# Patient Record
Sex: MALE | Race: WHITE
[De-identification: names, ages, dates, MRNs, and addresses within clinical notes are randomized per-mention and may not be internally consistent; named-entity substitution may affect disease eponyms.]

---

## 2018-07-03 ENCOUNTER — HOSPITAL ENCOUNTER (INPATIENT)
Dept: HOSPITAL 31 - C.ER | Age: 78
LOS: 4 days | Discharge: HOME | DRG: 884 | End: 2018-07-07
Attending: INTERNAL MEDICINE | Admitting: INTERNAL MEDICINE
Payer: MEDICARE

## 2018-07-03 VITALS — BODY MASS INDEX: 33.4 KG/M2

## 2018-07-03 DIAGNOSIS — R40.4: Primary | ICD-10-CM

## 2018-07-03 DIAGNOSIS — R79.89: ICD-10-CM

## 2018-07-03 DIAGNOSIS — J43.9: ICD-10-CM

## 2018-07-03 DIAGNOSIS — I10: ICD-10-CM

## 2018-07-03 DIAGNOSIS — I35.0: ICD-10-CM

## 2018-07-03 DIAGNOSIS — E78.00: ICD-10-CM

## 2018-07-03 DIAGNOSIS — Z87.891: ICD-10-CM

## 2018-07-03 DIAGNOSIS — E78.5: ICD-10-CM

## 2018-07-03 DIAGNOSIS — E86.0: ICD-10-CM

## 2018-07-03 DIAGNOSIS — G47.30: ICD-10-CM

## 2018-07-03 DIAGNOSIS — Z95.5: ICD-10-CM

## 2018-07-03 LAB
ALBUMIN SERPL-MCNC: 4.7 [, G/DL] (ref 3.5–5)
ALBUMIN/GLOB SERPL: 1.3 [,] (ref 1–2.1)
ALT SERPL-CCNC: 12 [, U/L] (ref 21–72)
AST SERPL-CCNC: 40 [, U/L] (ref 17–59)
BACTERIA #/AREA URNS HPF: (no result) [,]
BASOPHILS # BLD AUTO: 0.1 [, K/UL] (ref 0–0.2)
BASOPHILS NFR BLD: 1 [, %] (ref 0–2)
BILIRUB UR-MCNC: NEGATIVE [,]
BNP SERPL-MCNC: 220 [, PG/ML] (ref 0–900)
BUN SERPL-MCNC: 30 [, MG/DL] (ref 9–20)
CALCIUM SERPL-MCNC: 9.4 [, MG/DL] (ref 8.6–10.4)
EOSINOPHIL # BLD AUTO: 0 [, K/UL] (ref 0–0.7)
EOSINOPHIL NFR BLD: 0.6 [, %] (ref 0–4)
ERYTHROCYTE [DISTWIDTH] IN BLOOD BY AUTOMATED COUNT: 13.8 [, %] (ref 11.5–14.5)
GFR NON-AFRICAN AMERICAN: 20 [,]
GLUCOSE UR STRIP-MCNC: NORMAL [, MG/DL]
HGB BLD-MCNC: 16.9 [, G/DL] (ref 12–18)
HYALINE CASTS #/AREA URNS LPF: >20 [, /LPF] (ref 0–2)
LEUKOCYTE ESTERASE UR-ACNC: (no result) [, LEU/UL]
LYMPHOCYTES # BLD AUTO: 1.6 [, K/UL] (ref 1–4.3)
LYMPHOCYTES NFR BLD AUTO: 19.7 [, %] (ref 20–40)
MCH RBC QN AUTO: 33.6 [, PG] (ref 27–31)
MCHC RBC AUTO-ENTMCNC: 33.5 [, G/DL] (ref 33–37)
MCV RBC AUTO: 100.4 [, FL] (ref 80–94)
MONOCYTES # BLD: 0.8 [, K/UL] (ref 0–0.8)
MONOCYTES NFR BLD: 9.6 [, %] (ref 0–10)
NEUTROPHILS # BLD: 5.5 [, K/UL] (ref 1.8–7)
NEUTROPHILS NFR BLD AUTO: 69.1 [, %] (ref 50–75)
NRBC BLD AUTO-RTO: 0.1 [, %] (ref 0–2)
PH UR STRIP: 5 [,] (ref 5–8)
PLATELET # BLD: 290 [, K/UL] (ref 130–400)
PMV BLD AUTO: 7.7 [, FL] (ref 7.2–11.7)
PROT UR STRIP-MCNC: (no result) [, MG/DL]
RBC # BLD AUTO: 5.02 [, MIL/UL] (ref 4.4–5.9)
RBC # UR STRIP: (no result) [,]
SP GR UR STRIP: 1.02 [,] (ref 1–1.03)
SQUAMOUS EPITHIAL: 3 [, /HPF] (ref 0–5)
TROPONIN I SERPL-MCNC: 0.01 [, NG/ML] (ref 0–0.12)
UROBILINOGEN UR-MCNC: 2 [, MG/DL] (ref 0.2–1)
WBC # BLD AUTO: 8 [, K/UL] (ref 4.8–10.8)

## 2018-07-03 NOTE — RAD
PROCEDURE:  CHEST RADIOGRAPH, 1 VIEW



HISTORY:

SOB



COMPARISON:

Chest radiograph dated 06/12/2013.



FINDINGS:



LUNGS:

Bibasilar atelectasis.



PLEURA:

No pneumothorax or pleural fluid seen.



CARDIOVASCULAR:

Atherosclerotic aortic calcifications.  Cardiomediastinal silhouette 

the upper limits of normal in sizeNormal.



OSSEOUS STRUCTURES:

Degenerative changes.



VISUALIZED UPPER ABDOMEN:

Normal.



OTHER FINDINGS:

Small hiatal hernia. 



IMPRESSION:

No active disease.

## 2018-07-03 NOTE — C.PDOC
History Of Present Illness


78 year old male presents to the ER with family after he was found trying to 

enter [place] [employer] he believed was his home. As per family, patient has been 

becoming more confused as of late and is nothing new. Family states patient 

refuses to see his doctor. Denies trauma, injuries, or complaints.


Time Seen by Provider: 18 16:01


Chief Complaint (Nursing): Medical Clearance


History Per: Patient


History/Exam Limitations: no limitations


Onset/Duration Of Symptoms: Days


Current Symptoms Are (Timing): Still Present


Recent travel outside of the [place]: No





Past Medical History


Reviewed: Historical Data, Nursing Documentation, Vital Signs


Vital Signs: 


 Last Vital Signs











Temp  97.8 F   18 19:40


 


Pulse  74   18 19:40


 


Resp  16   18 19:40


 


BP  119/67   18 19:40


 


Pulse Ox  96   18 19:40














- Medical History


PMH: Cardia Arrhythmia, COPD, Emphysema, HTN, Hypercholesterolemia, Sleep Apnea 

(HAS C-PAP DOESN'T USE)


Surgical History: Coronary Stent (X2)





- [facility] Procedures








CORONARY ARTERIOGRAM NEC (08/26/15)


LEFT HEART CARDIAC CATH (08/26/15)








Family History: States: Unknown Family Hx





- Social History


Hx Tobacco Use: No


Hx Alcohol Use: Yes


Hx Substance Use: No





- Immunization History


Hx Tetanus Toxoid Vaccination: No


Hx Influenza Vaccination: No


Hx Pneumococcal Vaccination: No





Review Of Systems


Except As Marked, All Systems Reviewed And Found Negative.


Neurological: Positive for: Confusion





Physical Exam





- Physical Exam


Appears: Non-toxic


Skin: Normal Color, Warm, Dry


Head: Atraumatic, Normacephalic


Eye(s): bilateral: Normal Inspection


Oral Mucosa: Moist


Neck: Normal, Supple


Chest: Symmetrical, No Tenderness


Cardiovascular: Rhythm Regular


Respiratory: Normal Breath Sounds, No Rales, No Rhonchi, No Wheezing


Gastrointestinal/Abdominal: Soft, No Tenderness


Back: No CVA Tenderness


Extremity: Normal ROM (x4)


Neurological/Psych: Other (Awake, alert, oriented to time and place)





ED Course And Treatment





- Laboratory Results


Result Diagrams: 


 18 17:16





 18 17:16


ECG: Interpreted By Me, Viewed By Me


ECG Rhythm: Sinus Rhythm


ECG Interpretation: Normal


Interpretation Of ECG: Atrial contractions, normal intervals and axis, no ST/T 

wave abnormalities.


Rate From EC


O2 Sat by Pulse Oximetry: 99 (Room air)


Pulse Ox Interpretation: Normal





Medical Decision Making


Medical Decision Making: 





Plan:


* CT Head


* EKG


* Blood work


* CXR


* Urinalysis





Assessment: Dementia


acute kidney injury





case discussed with Dr. Ervin and will admit to medical surgical floor. 





Disposition


Discussed With .: Jose Ervin


Doctor Will See Patient In The: Hospital





- Disposition


Disposition: HOSPITALIZED


Disposition Time: 20:30


Condition: FAIR


Forms:  [facility] (English)





- Clinical Impression


Clinical Impression: 


 Confusion, Acute kidney injury








- Scribe Statement


The provider has reviewed the documentation as recorded by the Scribe





Steve Leung





All medical record entries made by the Scribe were at my direction and 

personally dictated by me. I have reviewed the chart and agree that the record 

accurately reflects my personal performance of the history, physical exam, 

medical decision making, and the department course for this patient. I have 

also personally directed, reviewed, and agree with the discharge instructions 

and disposition.

## 2018-07-04 VITALS — RESPIRATION RATE: 20 BRPM

## 2018-07-04 LAB
ALBUMIN SERPL-MCNC: 3.6 [, G/DL] (ref 3.5–5)
ALBUMIN/GLOB SERPL: 1.3 [,] (ref 1–2.1)
ALT SERPL-CCNC: 25 [, U/L] (ref 21–72)
AST SERPL-CCNC: 31 [, U/L] (ref 17–59)
BASOPHILS # BLD AUTO: 0 [, K/UL] (ref 0–0.2)
BASOPHILS NFR BLD: 0.6 [, %] (ref 0–2)
BUN SERPL-MCNC: 27 [, MG/DL] (ref 9–20)
CALCIUM SERPL-MCNC: 8.2 [, MG/DL] (ref 8.6–10.4)
CK MB SERPL-MCNC: 0.88 [, NG/ML] (ref 0–3.38)
CK MB SERPL-MCNC: 0.9 [, NG/ML] (ref 0–3.38)
EOSINOPHIL # BLD AUTO: 0.1 [, K/UL] (ref 0–0.7)
EOSINOPHIL NFR BLD: 1.7 [, %] (ref 0–4)
ERYTHROCYTE [DISTWIDTH] IN BLOOD BY AUTOMATED COUNT: 13.6 [, %] (ref 11.5–14.5)
GFR NON-AFRICAN AMERICAN: 59 [,]
HGB BLD-MCNC: 15 [, G/DL] (ref 12–18)
LYMPHOCYTES # BLD AUTO: 1.4 [, K/UL] (ref 1–4.3)
LYMPHOCYTES NFR BLD AUTO: 20.6 [, %] (ref 20–40)
MCH RBC QN AUTO: 34.8 [, PG] (ref 27–31)
MCHC RBC AUTO-ENTMCNC: 35 [, G/DL] (ref 33–37)
MCV RBC AUTO: 99.3 [, FL] (ref 80–94)
MONOCYTES # BLD: 0.6 [, K/UL] (ref 0–0.8)
MONOCYTES NFR BLD: 9.2 [, %] (ref 0–10)
NEUTROPHILS # BLD: 4.6 [, K/UL] (ref 1.8–7)
NEUTROPHILS NFR BLD AUTO: 67.9 [, %] (ref 50–75)
NRBC BLD AUTO-RTO: 0 [, %] (ref 0–2)
PLATELET # BLD: 186 [, K/UL] (ref 130–400)
PMV BLD AUTO: 8.3 [, FL] (ref 7.2–11.7)
RBC # BLD AUTO: 4.3 [, MIL/UL] (ref 4.4–5.9)
WBC # BLD AUTO: 6.7 [, K/UL] (ref 4.8–10.8)

## 2018-07-04 RX ADMIN — ENOXAPARIN SODIUM SCH MG: 40 INJECTION SUBCUTANEOUS at 13:19

## 2018-07-04 RX ADMIN — ROSUVASTATIN CALCIUM SCH MG: 5 TABLET, FILM COATED ORAL at 22:00

## 2018-07-04 NOTE — CP.PCM.HP
History of Present Illness





- History of Present Illness


History of Present Illness: 





COMPREHENSIVE   HISTORY & PHYSICAL EXAM 


Patient is admitted from our emergency room with 2 episodes of confusion and 

disorientation.


According to the family patient had 2 episodes of confusion disorientation 

transiently.  Last evening patient was entering the [employer] and declared that he 

was entering the house.  Patient currently has no neuro deficits and there is 

no evidence of any tonic-clonic seizures


   


HPI


History of hypertension no history of coronary artery disease MI or diabetes or 

previous history of stroke.





PAST HIST.


PERSONAL HIST:   Smoking.   N   Alcohol.   N      Allergy N            Travel_-

      .


FAMILY HIST : 


ROS :


Constitutional: Negative for weight change, chills, night sweats,  Eyes: 

Negative for redness, swelling, itching, discharge, vision changes, blurry 

vision, double vision, glaucoma, cataracts, 


  Ears: Negative for hearing loss, ringing, , tinnitus, vertigo


 Nose: Negative for rhinorrhea, stuffiness, sniffing, itching, postnasal drip, 

discoloration, nasal congestion and epistaxis. 


 Throat: Negative for throat clearing, sore throat, hoarseness, difficulty 

swallowing and difficulty speaking. 


  Respiratory: Negative for cough, , sputum production, chest tightness,  

wheezing, pleuritic chest pain ,daytime somnolence, chronic cough, hemoptysis, 

snoring at night,


 Cardiovascular: Negative for chest pain, palpitations, orthopnea, PND, Edema 

of legs, leg cramps, angina, claudication, , irregular heartbeat,


 Neurology: Negative for irritability, muscle weakness, numbness and tingling, 

seizures, tremors, migraines, slurred speech, , recurrent headaches 


Gastrointestinal: Negative for difficulty swallowing, diarrhea, constipation, 

black stools, rectal bleeding, nausea, flatulence, reflux, poor appetite, 

changes in bowel habits, abdominal pain


  Genitourinary: Negative for frequent urination, hematuria, discharge, 

incontinence, urinary retention, frequent UTI, Psychiatric: Negative for 

depression, anxiety/panic, suicidal tendencies, 


Musculoskeletal: Negative for swollen joints, back pain, , neck pain, morning 

stiffness of joints, . 


 Skin: Negative for rash, ulcers, itching, dry skin and pigmented lesions.


P/E: 


  Constitutional: Appears stated age and in no apparent distress. 


 Head: Normocephalic. 


  Ears: External ear canals patent without inflammation. Tympanic membranes 

intact with normal light reflex and landmark. 


  Eyes: Pupils are central, bilaterally equal, symmetrical and reacts to light 

with normal movements and no icterus or pallor. 


 Nose: External nares are patent. Mucosa is pink  Mouth-Throat: Good general 

appearance and condition. No post-pharyngeal/oropharyngeal erythema and 

tonsillar hypertrophy. Good dental hygiene. 


  Neck-Lymphatic: Neck is supple with normal ROM, no thyromegaly, lymph nodes 

or masses. JVD is normal with no carotid bruit. 


  Lungs: Clear to percussion and auscultation with bilateral normal air entry. 


  Cardiovascular: S1 and S2 are normal with no murmurs, gallops and rub. 


  GI Exam: No hepatomegaly. Abdomen is soft and non-tender. No Organomegaly , 

masses or hernias are evident and bowel sounds are normal and active. 


  Neurology: Higher function and all cranial nerves intact, with no gross motor 

or sensory deficit. Superficial and deep reflexes are normal with downwards 

planters. No cerebellar deficit with normal gait. 


  Musculoskeletal: No tender spots with normal curvature of the spine with no 

swelling or restricted ROM of the small and large joints. 


  Extremities: Homans sign absent. Intact pulses with no pitting edema, calf 

tenderness or skin color changes. 


  Skin: No rash, eruptions or abnormal skin pigmentation





LAB/RADIOLOGY:


ASSESMENT :


Transient episode of altered mental status without any neuro deficit.  Will 

need a neuro workup for possible seizures.  Will also need a workup for cardiac 

for cardiac arrhythmias.


Hypertension stable





PLAN: 


See the orders planned.








Present on Admission





- Present on Admission


Any Indicators Present on Admission: No





Past Patient History





- Past Medical History & Family History


Past Medical History?: Yes





- Past Social History


Smoking Status: Former Smoker





- CARDIAC


Hx Cardiac Disorders: Yes


Hx Cardia Arrhythmia: Yes


Hx Hypercholesterolemia: Yes


Hx Hypertension: Yes





- PULMONARY


Hx Respiratory Disorders: Yes


Hx Chronic Obstructive Pulmonary Disease (COPD): Yes


Hx Emphysema: Yes


Hx Sleep Apnea: Yes (HAS C-PAP DOESN'T USE)





- NEUROLOGICAL


Hx Neurological Disorder: No





- HEENT


Hx HEENT Problems: Yes (READING GLASSES)





- RENAL


Hx Chronic Kidney Disease: No





- ENDOCRINE/METABOLIC


Hx Endocrine Disorders: No





- HEMATOLOGICAL/ONCOLOGICAL


Hx Blood Disorders: No





- INTEGUMENTARY


Hx Dermatological Problems: No





- MUSCULOSKELETAL/RHEUMATOLOGICAL


Hx Musculoskeletal Disorders: Yes


Hx Falls: Yes





- GASTROINTESTINAL


Hx Gastrointestinal Disorders: No





- GENITOURINARY/GYNECOLOGICAL


Hx Genitourinary Disorders: No





- PSYCHIATRIC


Hx Psychophysiologic Disorder: No


Hx Substance Use: No





- SURGICAL HISTORY


Hx Surgeries: Yes


Hx Coronary Stent: Yes (X2)





- ANESTHESIA


Hx Anesthesia: Yes


Hx Anesthesia Reactions: No


Hx Malignant Hyperthermia: No





Meds


Allergies/Adverse Reactions: 


 Allergies











Allergy/AdvReac Type Severity Reaction Status Date / Time


 


No Known Allergies Allergy   Verified 07/03/18 16:00














Results





- Vital Signs


Recent Vital Signs: 





 Last Vital Signs











Temp  98.0 F   07/04/18 07:36


 


Pulse  69   07/04/18 07:36


 


Resp  18   07/04/18 07:36


 


BP  131/71   07/04/18 07:36


 


Pulse Ox  97   07/04/18 07:36














- Labs


Result Diagrams: 


 07/04/18 07:38





 07/04/18 07:38


Labs: 





 Laboratory Results - last 24 hr











  07/03/18 07/03/18 07/03/18





  16:43 17:16 17:16


 


WBC   8.0 


 


RBC   5.02 


 


Hgb   16.9 


 


Hct   50.4 


 


MCV   100.4 H 


 


MCH   33.6 H 


 


MCHC   33.5 


 


RDW   13.8 


 


Plt Count   290 


 


MPV   7.7 


 


Neut % (Auto)   69.1 


 


Lymph % (Auto)   19.7 L 


 


Mono % (Auto)   9.6 


 


Eos % (Auto)   0.6 


 


Baso % (Auto)   1.0 


 


Neut # (Auto)   5.5 


 


Lymph # (Auto)   1.6 


 


Mono # (Auto)   0.8 


 


Eos # (Auto)   0.0 


 


Baso # (Auto)   0.1 


 


Sodium    150 H


 


Potassium    3.9


 


Chloride    109 H


 


Carbon Dioxide    22


 


Anion Gap    23 H


 


BUN    30 H


 


Creatinine    3.0 H


 


Est GFR ( Amer)    25


 


Est GFR (Non-Af Amer)    20


 


Random Glucose    120 H


 


Calcium    9.4


 


Magnesium    2.8 H


 


Total Bilirubin    0.8


 


AST    40


 


ALT    12 L D


 


Alkaline Phosphatase    147 H D


 


Ammonia   


 


Total Creatine Kinase   


 


CK-MB (Mass)   


 


Troponin I    0.0120


 


NT-Pro-B Natriuret Pep    220


 


Total Protein    8.2


 


Albumin    4.7


 


Globulin    3.5


 


Albumin/Globulin Ratio    1.3


 


TSH 3rd Generation    1.68


 


Urine Color  Jina  


 


Urine Clarity  Hazy  


 


Urine pH  5.0  


 


Ur Specific Gravity  1.021  


 


Urine Protein  2+ H  


 


Urine Glucose (UA)  Normal  


 


Urine Ketones  Negative  


 


Urine Blood  3+ H  


 


Urine Nitrate  Negative  


 


Urine Bilirubin  Negative  


 


Urine Urobilinogen  2.0  


 


Ur Leukocyte Esterase  Neg  


 


Urine WBC (Auto)  5  


 


Urine RBC (Auto)  274 H  


 


Ur Squamous Epith Cells  3  


 


Urine Bacteria  Occ H  


 


Hyaline Casts  >20 H  














  07/03/18 07/04/18 07/04/18





  19:15 03:13 07:38


 


WBC    6.7


 


RBC    4.30 L


 


Hgb    15.0


 


Hct    42.7


 


MCV    99.3 H


 


MCH    34.8 H


 


MCHC    35.0


 


RDW    13.6


 


Plt Count    186  D


 


MPV    8.3


 


Neut % (Auto)    67.9


 


Lymph % (Auto)    20.6


 


Mono % (Auto)    9.2


 


Eos % (Auto)    1.7


 


Baso % (Auto)    0.6


 


Neut # (Auto)    4.6


 


Lymph # (Auto)    1.4


 


Mono # (Auto)    0.6


 


Eos # (Auto)    0.1


 


Baso # (Auto)    0.0


 


Sodium   


 


Potassium   


 


Chloride   


 


Carbon Dioxide   


 


Anion Gap   


 


BUN   


 


Creatinine   


 


Est GFR ( Amer)   


 


Est GFR (Non-Af Amer)   


 


Random Glucose   


 


Calcium   


 


Magnesium   


 


Total Bilirubin   


 


AST   


 


ALT   


 


Alkaline Phosphatase   


 


Ammonia  39 H  


 


Total Creatine Kinase   61 


 


CK-MB (Mass)   0.88 


 


Troponin I   < 0.0120 


 


NT-Pro-B Natriuret Pep   


 


Total Protein   


 


Albumin   


 


Globulin   


 


Albumin/Globulin Ratio   


 


TSH 3rd Generation   


 


Urine Color   


 


Urine Clarity   


 


Urine pH   


 


Ur Specific Gravity   


 


Urine Protein   


 


Urine Glucose (UA)   


 


Urine Ketones   


 


Urine Blood   


 


Urine Nitrate   


 


Urine Bilirubin   


 


Urine Urobilinogen   


 


Ur Leukocyte Esterase   


 


Urine WBC (Auto)   


 


Urine RBC (Auto)   


 


Ur Squamous Epith Cells   


 


Urine Bacteria   


 


Hyaline Casts   














  07/04/18 07/04/18





  07:38 11:33


 


WBC  


 


RBC  


 


Hgb  


 


Hct  


 


MCV  


 


MCH  


 


MCHC  


 


RDW  


 


Plt Count  


 


MPV  


 


Neut % (Auto)  


 


Lymph % (Auto)  


 


Mono % (Auto)  


 


Eos % (Auto)  


 


Baso % (Auto)  


 


Neut # (Auto)  


 


Lymph # (Auto)  


 


Mono # (Auto)  


 


Eos # (Auto)  


 


Baso # (Auto)  


 


Sodium  140 


 


Potassium  3.7 


 


Chloride  105 


 


Carbon Dioxide  25 


 


Anion Gap  14 


 


BUN  27 H 


 


Creatinine  1.2 


 


Est GFR ( Amer)  > 60 


 


Est GFR (Non-Af Amer)  59 


 


Random Glucose  86 


 


Calcium  8.2 L 


 


Magnesium  


 


Total Bilirubin  0.9 


 


AST  31 


 


ALT  25 


 


Alkaline Phosphatase  134 H 


 


Ammonia  


 


Total Creatine Kinase   65


 


CK-MB (Mass)   0.90


 


Troponin I   < 0.0120


 


NT-Pro-B Natriuret Pep  


 


Total Protein  6.4 


 


Albumin  3.6 


 


Globulin  2.8 


 


Albumin/Globulin Ratio  1.3 


 


TSH 3rd Generation  


 


Urine Color  


 


Urine Clarity  


 


Urine pH  


 


Ur Specific Gravity  


 


Urine Protein  


 


Urine Glucose (UA)  


 


Urine Ketones  


 


Urine Blood  


 


Urine Nitrate  


 


Urine Bilirubin  


 


Urine Urobilinogen  


 


Ur Leukocyte Esterase  


 


Urine WBC (Auto)  


 


Urine RBC (Auto)  


 


Ur Squamous Epith Cells  


 


Urine Bacteria  


 


Hyaline Casts

## 2018-07-04 NOTE — CP.PCM.CON
History of Present Illness





- History of Present Illness


History of Present Illness: 


 Neurology Consultation Note:


Mr. Mann is a 78-year-old man with a past medical history of HTN, HLD, who 

has had two isolated events of confusion and getting lost for several hours, 

with subsequent return to baseline mental function.  He was brought in 

yesterday after he attempted to enter a [employer], confusing it for his home.  CT 

scan of the head was done an did not show any acute intracranial pathology. 





Review of Systems





- Review of Systems


All systems: reviewed and no additional remarkable complaints except





Past Patient History





- Past Medical History & Family History


Past Medical History?: Yes





- Past Social History


Smoking Status: Former Smoker





- CARDIAC


Hx Cardiac Disorders: Yes


Hx Cardia Arrhythmia: Yes


Hx Hypercholesterolemia: Yes


Hx Hypertension: Yes





- PULMONARY


Hx Respiratory Disorders: Yes


Hx Chronic Obstructive Pulmonary Disease (COPD): Yes


Hx Emphysema: Yes


Hx Sleep Apnea: Yes (HAS C-PAP DOESN'T USE)





- NEUROLOGICAL


Hx Neurological Disorder: No





- HEENT


Hx HEENT Problems: Yes (READING GLASSES)





- RENAL


Hx Chronic Kidney Disease: No





- ENDOCRINE/METABOLIC


Hx Endocrine Disorders: No





- HEMATOLOGICAL/ONCOLOGICAL


Hx Blood Disorders: No





- INTEGUMENTARY


Hx Dermatological Problems: No





- MUSCULOSKELETAL/RHEUMATOLOGICAL


Hx Musculoskeletal Disorders: Yes


Hx Falls: Yes





- GASTROINTESTINAL


Hx Gastrointestinal Disorders: No





- GENITOURINARY/GYNECOLOGICAL


Hx Genitourinary Disorders: No





- PSYCHIATRIC


Hx Psychophysiologic Disorder: No


Hx Substance Use: No





- SURGICAL HISTORY


Hx Surgeries: Yes


Hx Coronary Stent: Yes (X2)





- ANESTHESIA


Hx Anesthesia: Yes


Hx Anesthesia Reactions: No


Hx Malignant Hyperthermia: No





Meds


Allergies/Adverse Reactions: 


 Allergies











Allergy/AdvReac Type Severity Reaction Status Date / Time


 


No Known Allergies Allergy   Verified 18 16:00














- Medications


Medications: 


 Current Medications





Amlodipine Besylate (Norvasc)  5 mg PO DAILY [facility]


   Last Admin: 18 10:44 Dose:  5 mg


Aspirin (Aspirin Chewable)  81 mg PO DAILY [facility]


   Last Admin: 18 10:44 Dose:  81 mg


Enoxaparin Sodium (Lovenox)  40 mg SC DAILY [facility]


Sodium Chloride (Sodium Chloride 0.45%)  1,000 mls @ 80 mls/hr IV .[id number] [facility]


   Last Admin: 18 10:44 Dose:  80 mls/hr


Losartan Potassium (Cozaar)  100 mg PO DAILY [facility]


Rosuvastatin Calcium (Crestor)  2.5 mg PO HS [facility]











Physical Exam





- Neurological Exam


Neurological exam: Alert, CN II-XII Intact, Normal Gait, Oriented x3, Reflexes 

Normal


Additional comments: 





Able to recall 2/3 objects after a short delay, can spell WORLD frontward and 

backward, able to complete subtraction task to one level.  Strength is 

symmetrical, sensation is intact, reflexes are normal, gait is normal, Romberg 

is negative.  





Results





- Vital Signs


Recent Vital Signs: 


 Last Vital Signs











Temp  98.0 F   18 07:36


 


Pulse  69   18 07:36


 


Resp  18   18 07:36


 


BP  131/71   18 07:36


 


Pulse Ox  97   18 07:36














- Labs


Result Diagrams: 


 18 07:38





 18 07:38


Labs: 


 Laboratory Results - last 24 hr











  18





  16:43 17:16 17:16


 


WBC   8.0 


 


RBC   5.02 


 


Hgb   16.9 


 


Hct   50.4 


 


MCV   100.4 H 


 


MCH   33.6 H 


 


MCHC   33.5 


 


RDW   13.8 


 


Plt Count   290 


 


MPV   7.7 


 


Neut % (Auto)   69.1 


 


Lymph % (Auto)   19.7 L 


 


Mono % (Auto)   9.6 


 


Eos % (Auto)   0.6 


 


Baso % (Auto)   1.0 


 


Neut # (Auto)   5.5 


 


Lymph # (Auto)   1.6 


 


Mono # (Auto)   0.8 


 


Eos # (Auto)   0.0 


 


Baso # (Auto)   0.1 


 


Sodium    150 H


 


Potassium    3.9


 


Chloride    109 H


 


Carbon Dioxide    22


 


Anion Gap    23 H


 


BUN    30 H


 


Creatinine    3.0 H


 


Est GFR ( Amer)    25


 


Est GFR (Non-Af Amer)    20


 


Random Glucose    120 H


 


Calcium    9.4


 


Magnesium    2.8 H


 


Total Bilirubin    0.8


 


AST    40


 


ALT    12 L D


 


Alkaline Phosphatase    147 H D


 


Ammonia   


 


Total Creatine Kinase   


 


CK-MB (Mass)   


 


Troponin I    0.0120


 


NT-Pro-B Natriuret Pep    220


 


Total Protein    8.2


 


Albumin    4.7


 


Globulin    3.5


 


Albumin/Globulin Ratio    1.3


 


TSH 3rd Generation    1.68


 


Urine Color  Jina  


 


Urine Clarity  Hazy  


 


Urine pH  5.0  


 


Ur Specific Gravity  1.021  


 


Urine Protein  2+ H  


 


Urine Glucose (UA)  Normal  


 


Urine Ketones  Negative  


 


Urine Blood  3+ H  


 


Urine Nitrate  Negative  


 


Urine Bilirubin  Negative  


 


Urine Urobilinogen  2.0  


 


Ur Leukocyte Esterase  Neg  


 


Urine WBC (Auto)  5  


 


Urine RBC (Auto)  274 H  


 


Ur Squamous Epith Cells  3  


 


Urine Bacteria  Occ H  


 


Hyaline Casts  >20 H  














  18





  19:15 03:13 07:38


 


WBC    6.7


 


RBC    4.30 L


 


Hgb    15.0


 


Hct    42.7


 


MCV    99.3 H


 


MCH    34.8 H


 


MCHC    35.0


 


RDW    13.6


 


Plt Count    186  D


 


MPV    8.3


 


Neut % (Auto)    67.9


 


Lymph % (Auto)    20.6


 


Mono % (Auto)    9.2


 


Eos % (Auto)    1.7


 


Baso % (Auto)    0.6


 


Neut # (Auto)    4.6


 


Lymph # (Auto)    1.4


 


Mono # (Auto)    0.6


 


Eos # (Auto)    0.1


 


Baso # (Auto)    0.0


 


Sodium   


 


Potassium   


 


Chloride   


 


Carbon Dioxide   


 


Anion Gap   


 


BUN   


 


Creatinine   


 


Est GFR ( Amer)   


 


Est GFR (Non-Af Amer)   


 


Random Glucose   


 


Calcium   


 


Magnesium   


 


Total Bilirubin   


 


AST   


 


ALT   


 


Alkaline Phosphatase   


 


Ammonia  39 H  


 


Total Creatine Kinase   61 


 


CK-MB (Mass)   0.88 


 


Troponin I   < 0.0120 


 


NT-Pro-B Natriuret Pep   


 


Total Protein   


 


Albumin   


 


Globulin   


 


Albumin/Globulin Ratio   


 


TSH 3rd Generation   


 


Urine Color   


 


Urine Clarity   


 


Urine pH   


 


Ur Specific Gravity   


 


Urine Protein   


 


Urine Glucose (UA)   


 


Urine Ketones   


 


Urine Blood   


 


Urine Nitrate   


 


Urine Bilirubin   


 


Urine Urobilinogen   


 


Ur Leukocyte Esterase   


 


Urine WBC (Auto)   


 


Urine RBC (Auto)   


 


Ur Squamous Epith Cells   


 


Urine Bacteria   


 


Hyaline Casts   














  18





  07:38 11:33


 


WBC  


 


RBC  


 


Hgb  


 


Hct  


 


MCV  


 


MCH  


 


MCHC  


 


RDW  


 


Plt Count  


 


MPV  


 


Neut % (Auto)  


 


Lymph % (Auto)  


 


Mono % (Auto)  


 


Eos % (Auto)  


 


Baso % (Auto)  


 


Neut # (Auto)  


 


Lymph # (Auto)  


 


Mono # (Auto)  


 


Eos # (Auto)  


 


Baso # (Auto)  


 


Sodium  140 


 


Potassium  3.7 


 


Chloride  105 


 


Carbon Dioxide  25 


 


Anion Gap  14 


 


BUN  27 H 


 


Creatinine  1.2 


 


Est GFR ( Amer)  > 60 


 


Est GFR (Non-Af Amer)  59 


 


Random Glucose  86 


 


Calcium  8.2 L 


 


Magnesium  


 


Total Bilirubin  0.9 


 


AST  31 


 


ALT  25 


 


Alkaline Phosphatase  134 H 


 


Ammonia  


 


Total Creatine Kinase   65


 


CK-MB (Mass)   0.90


 


Troponin I   < 0.0120


 


NT-Pro-B Natriuret Pep  


 


Total Protein  6.4 


 


Albumin  3.6 


 


Globulin  2.8 


 


Albumin/Globulin Ratio  1.3 


 


TSH 3rd Generation  


 


Urine Color  


 


Urine Clarity  


 


Urine pH  


 


Ur Specific Gravity  


 


Urine Protein  


 


Urine Glucose (UA)  


 


Urine Ketones  


 


Urine Blood  


 


Urine Nitrate  


 


Urine Bilirubin  


 


Urine Urobilinogen  


 


Ur Leukocyte Esterase  


 


Urine WBC (Auto)  


 


Urine RBC (Auto)  


 


Ur Squamous Epith Cells  


 


Urine Bacteria  


 


Hyaline Casts  














Assessment & Plan


(1) Confusion


Assessment and Plan: 


The patient has had two such events, described by family as confusion, 

disorientation, etc.  The patient has little recollection of these events.  He 

may be having complex partial seizures and will need further work-up/

management.  I recommend the followin. MRI of the brain with and without contrast


2. EEG awake and drowsy for 30 minutes


3. Start Keppra 500 mg BID


4. PT/OT eval





Thank you. 


Status: Acute   Priority: High

## 2018-07-05 LAB
ALBUMIN SERPL-MCNC: 3.4 [, G/DL] (ref 3.5–5)
ALBUMIN/GLOB SERPL: 1.3 [,] (ref 1–2.1)
ALT SERPL-CCNC: 24 [, U/L] (ref 21–72)
AST SERPL-CCNC: 26 [, U/L] (ref 17–59)
BASOPHILS # BLD AUTO: 0 [, K/UL] (ref 0–0.2)
BASOPHILS NFR BLD: 0.9 [, %] (ref 0–2)
BUN SERPL-MCNC: 15 [, MG/DL] (ref 9–20)
CALCIUM SERPL-MCNC: 8.5 [, MG/DL] (ref 8.6–10.4)
EOSINOPHIL # BLD AUTO: 0.1 [, K/UL] (ref 0–0.7)
EOSINOPHIL NFR BLD: 1.8 [, %] (ref 0–4)
ERYTHROCYTE [DISTWIDTH] IN BLOOD BY AUTOMATED COUNT: 13.3 [, %] (ref 11.5–14.5)
GFR NON-AFRICAN AMERICAN: > 60 [,]
HGB BLD-MCNC: 14.9 [, G/DL] (ref 12–18)
LYMPHOCYTES # BLD AUTO: 1.1 [, K/UL] (ref 1–4.3)
LYMPHOCYTES NFR BLD AUTO: 20.2 [, %] (ref 20–40)
MCH RBC QN AUTO: 34.2 [, PG] (ref 27–31)
MCHC RBC AUTO-ENTMCNC: 34.6 [, G/DL] (ref 33–37)
MCV RBC AUTO: 98.9 [, FL] (ref 80–94)
MONOCYTES # BLD: 0.5 [, K/UL] (ref 0–0.8)
MONOCYTES NFR BLD: 8.9 [, %] (ref 0–10)
NEUTROPHILS # BLD: 3.7 [, K/UL] (ref 1.8–7)
NEUTROPHILS NFR BLD AUTO: 68.2 [, %] (ref 50–75)
NRBC BLD AUTO-RTO: 0.1 [, %] (ref 0–2)
PLATELET # BLD: 182 [, K/UL] (ref 130–400)
PMV BLD AUTO: 8.1 [, FL] (ref 7.2–11.7)
RBC # BLD AUTO: 4.37 [, MIL/UL] (ref 4.4–5.9)
WBC # BLD AUTO: 5.4 [, K/UL] (ref 4.8–10.8)

## 2018-07-05 RX ADMIN — ROSUVASTATIN CALCIUM SCH MG: 5 TABLET, FILM COATED ORAL at 21:23

## 2018-07-05 RX ADMIN — ENOXAPARIN SODIUM SCH MG: 40 INJECTION SUBCUTANEOUS at 09:49

## 2018-07-05 NOTE — CARD
--------------- APPROVED REPORT --------------





EKG Measurement

Heart [id number]

MN [id number]

[id number]

[id number]

[id number]



<Conclusion>

Sinus rhythm with premature atrial complexes

Prolonged QT

Abnormal ECG

## 2018-07-05 NOTE — CP.PCM.PN
Subjective





- Date & Time of Evaluation


Date of Evaluation: 07/05/18


Time of Evaluation: 13:00





- Subjective


Subjective: 


PGY-1 Neurology f/u for Dr. Cervantes's service





Mr. Mann was seen and examined at bedside. No acute overnight events 

reported. Patient was alert, responded to questions appropriately. Has some 

isolated confusion as he does not recall the episode in [place] that occurred 

yesterday. CT scan of the head was done an did not show any acute intracranial 

pathology.





Objective





- Vital Signs/Intake and Output


Vital Signs (last 24 hours): 


 











Temp Pulse Resp BP Pulse Ox


 


 98.5 F   86   20   154/88 H  98 


 


 07/05/18 15:46  07/05/18 15:46  07/05/18 15:46  07/05/18 15:46  07/05/18 15:46








Intake and Output: 


 











 07/05/18 07/05/18





 06:59 18:59


 


Intake Total 730 


 


Output Total 800 


 


Balance -70 














- Medications


Medications: 


 Current Medications





Amlodipine Besylate (Norvasc)  5 mg PO DAILY [facility]


   Last Admin: 07/05/18 09:53 Dose:  5 mg


Aspirin (Aspirin Chewable)  81 mg PO DAILY [facility]


   Last Admin: 07/05/18 09:49 Dose:  81 mg


Enoxaparin Sodium (Lovenox)  40 mg SC DAILY [facility]


   Last Admin: 07/05/18 09:49 Dose:  40 mg


Sodium Chloride (Sodium Chloride 0.45%)  1,000 mls @ 80 mls/hr IV .[id number] [facility]


   Last Admin: 07/05/18 15:53 Dose:  80 mls/hr


Levetiracetam (Keppra)  500 mg PO BID [facility]


   Last Admin: 07/05/18 17:18 Dose:  500 mg


Losartan Potassium (Cozaar)  100 mg PO DAILY [facility]


   Last Admin: 07/05/18 09:53 Dose:  100 mg


Rosuvastatin Calcium (Crestor)  2.5 mg PO HS [facility]


   Last Admin: 07/04/18 22:00 Dose:  2.5 mg











- Labs


Labs: 


 





 07/05/18 07:00 





 07/05/18 07:00 











- Constitutional


Appears: Well, No Acute Distress, Confused





- Head Exam


Head Exam: NORMAL INSPECTION





- Eye Exam


Eye Exam: Normal appearance


Pupil Exam: NORMAL ACCOMODATION





- ENT Exam


ENT Exam: Normal Exam





- Neck Exam


Neck Exam: Normal Inspection





- Respiratory Exam


Respiratory Exam: Clear to Ausculation Bilateral





- Cardiovascular Exam


Cardiovascular Exam: REGULAR RHYTHM, +S1, +S2





- Extremities Exam


Extremities Exam: Normal Inspection





- Neurological Exam


Neurological Exam: Alert, Awake, CN II-XII Intact, Normal Gait, Oriented x3





- Skin


Skin Exam: Intact, Normal Color





Assessment and Plan





- Assessment and Plan (Free Text)


Assessment: 


77 yo  M, PMHx of HTN, HLD, who has had two isolated events of 

confusion and getting lost for several hours, with subsequent return to 

baseline mental function.  He was brought in yesterday after he attempted to 

enter a [place], confusing it for his home.  CT scan of the head was done an did 

not show any acute intracranial pathology. 


Plan: 


1. Confusion


--EEG: No focal slowing no seizure like activity was observed


--f/u MRI brain with and without contrast


--continue w/ Keppra 500 mg PO BID

## 2018-07-05 NOTE — CP.PCM.PN
Subjective





- Date & Time of Evaluation


Date of Evaluation: 07/05/18


Time of Evaluation: 13:52





- Subjective


Subjective: 





CHIEF COMPLAINTS TODAY :


patient is a periods of confusion and disorientation.





ROS.


HEENT :  N.


Resp :       No cough, wheezing ,pleuritic CP ,or hemoptysis 


Cardio :     No anginal  CP, PND, orthopnea, palpitation 


GI :           No abd.pain, n/v ,diarrhea or GI bleeding .


CNS : No headache, vertigo, focal deficit.


Musculoskel :  No joint swelling ,


Derm :        No rash 


Psych :     Normal affect.


Ext :  No  swelling ,calf pain 





PE.


Pt. is alert awake in no distress.


V.S  As noted in the chart 


Head ,ear nose,throat and eyes : Normal.


Neck : Supple with normal carotids.


Lungs: Clear air entry.


Heart : S1 & S2 normal with S4.systolic ejection murmur in the aortic area 3 /6.


Abd : Soft non tender with normal bowel sounds.


Neuro : Moves all ext. with no localized deficit.


Ext : No edema with intact pulses.Non tender calves 


Derm : No rashes or decubitus ulcer.





LABS/RADIOLOGY:





ASSESSMENT/PLAN : 


so far septic workup is negative.


Neurocardiac workup is ongoing.


Creatinine is now normalized.











Objective





- Vital Signs/Intake and Output


Vital Signs (last 24 hours): 


 











Temp Pulse Resp BP Pulse Ox


 


 98.2 F   72   20   144/80   97 


 


 07/05/18 09:53  07/05/18 09:53  07/05/18 09:53  07/05/18 09:53  07/05/18 09:53








Intake and Output: 


 











 07/05/18 07/05/18





 11:59 23:59


 


Output Total 600 


 


Balance -600 














- Medications


Medications: 


 Current Medications





Amlodipine Besylate (Norvasc)  5 mg PO DAILY [facility]


   Last Admin: 07/05/18 09:53 Dose:  5 mg


Aspirin (Aspirin Chewable)  81 mg PO DAILY [facility]


   Last Admin: 07/05/18 09:49 Dose:  81 mg


Enoxaparin Sodium (Lovenox)  40 mg SC DAILY [facility]


   Last Admin: 07/05/18 09:49 Dose:  40 mg


Sodium Chloride (Sodium Chloride 0.45%)  1,000 mls @ 80 mls/hr IV .[id number] [facility]


   Last Admin: 07/05/18 09:50 Dose:  Not Given


Levetiracetam (Keppra)  500 mg PO BID [facility]


   Last Admin: 07/05/18 09:49 Dose:  500 mg


Losartan Potassium (Cozaar)  100 mg PO DAILY [facility]


   Last Admin: 07/05/18 09:53 Dose:  100 mg


Rosuvastatin Calcium (Crestor)  2.5 mg PO [facility]


   Last Admin: 07/04/18 22:00 Dose:  2.5 mg











- Labs


Labs: 


 





 07/05/18 07:00 





 07/05/18 07:00

## 2018-07-05 NOTE — VASCLAB
PROCEDURE:  



HISTORY:

TIA



COMPARISON:

None available. 



TECHNIQUE:

Grayscale and duplex Doppler evaluation of the cervical carotid and 

vertebral arteries were performed. The common carotid, carotid 

bifurcations and cervical Internal Carotid Artery (ICA) and proximal 

External Carotid Artery (ECA) were evaluated.  The vertebral arteries 

were evaluated for gross patency and flow direction. 



Report prepared by  BRITTNEY Wang



FINDINGS:



RIGHT CAROTID ARTERIES:

1. Common Carotid Artery: No significant focal plaque formation of 

the right common carotid artery. Maximum Peak Systolic velocity: 71 

cm/sec: End-diastolic velocity 6 cm/sec.



2. Carotid Bifurcation: Calcific plaque formation. Maximum Peak 

Systolic velocity: 28 cm/sec: End-diastolic velocity 6 cm/sec.  



3. Internal Carotid Artery:    Plaque description: Calcific  



3.1. Proximal Segment: Peak systolic velocity 57cm/sec: End-diastolic 

velocity  13 cm/sec - % stenosis  0-15%



3.2. Middle Segment:    Peak systolic velocity 75 cm/sec: 

End-diastolic velocity  18  cm/sec - % stenosis 0-15%



3.3. Distal Segment:     Peak systolic velocity 38 cm/sec: 

End-diastolic velocity  11  cm/sec - % stenosis 0-15%



4. External Carotid Artery: No significant focal plaque formation. 

Peak systolic velocity 73 cm/sec



5. ICA/CCA Ratio: 1.9



LEFT CAROTID ARTERIES:

1. Common Carotid Artery: No significant focal plaque formation of 

the left common carotid artery. Maximum Peak Systolic velocity: 87 

cm/sec: End-diastolic velocity 14 cm/sec.



2. Carotid Bifurcation: Calcific plaque formation. Maximum Peak 

Systolic velocity: 48 cm/sec: End-diastolic velocity 0 cm/sec.



3. Internal Carotid Artery:      Plaque description: Calcific 



3.1. Proximal Segment: Peak systolic velocity 55 cm/sec: 

End-diastolic velocity 13 cm/sec - % stenosis 0-15%



3.2. Middle Segment:    Peak systolic velocity 75 cm/sec: 

End-diastolic velocity 16 cm/sec - % stenosis 0-15%



3.3. Distal Segment:     Peak systolic velocity 69 cm/sec: 

End-diastolic velocity 19 cm/sec - % stenosis 0-15%



4. External Carotid Artery: No significant focal plaque formation. 

Peak systolic velocity 78 cm/sec



5. ICA/CCA Ratio: 1.1



VERTEBRAL ARTERIES:

1. Right Vertebral Artery: The right vertebral artery flow direction 

is  antegrade.



2. Left Vertebral Artery:   The left vertebral artery flow direction 

is antegrade.



OTHER FINDINGS:

1. Right Brachial Blood pressure: 140 mmHg.



2. Left Brachial Blood pressure: 145 mmHg.



IMPRESSION:

RIGHT:  Duplex scan does not suggest hemodynamically significant 

stenosis of the right extracranial carotid arteries.  



LEFT:  Duplex scan does not suggest hemodynamically significant 

stenosis of the left extracranial carotid arteries.

## 2018-07-06 LAB
ALBUMIN SERPL-MCNC: 3.8 [, G/DL] (ref 3.5–5)
ALBUMIN/GLOB SERPL: 1.4 [,] (ref 1–2.1)
ALT SERPL-CCNC: 29 [, U/L] (ref 21–72)
AST SERPL-CCNC: 28 [, U/L] (ref 17–59)
BASOPHILS # BLD AUTO: 0 [, K/UL] (ref 0–0.2)
BASOPHILS NFR BLD: 0.6 [, %] (ref 0–2)
BUN SERPL-MCNC: 9 [, MG/DL] (ref 9–20)
CALCIUM SERPL-MCNC: 9 [, MG/DL] (ref 8.6–10.4)
EOSINOPHIL # BLD AUTO: 0.1 [, K/UL] (ref 0–0.7)
EOSINOPHIL NFR BLD: 1.2 [, %] (ref 0–4)
ERYTHROCYTE [DISTWIDTH] IN BLOOD BY AUTOMATED COUNT: 13.2 [, %] (ref 11.5–14.5)
GFR NON-AFRICAN AMERICAN: > 60 [,]
HGB BLD-MCNC: 15.5 [, G/DL] (ref 12–18)
LYMPHOCYTES # BLD AUTO: 1 [, K/UL] (ref 1–4.3)
LYMPHOCYTES NFR BLD AUTO: 16.6 [, %] (ref 20–40)
MCH RBC QN AUTO: 34.2 [, PG] (ref 27–31)
MCHC RBC AUTO-ENTMCNC: 34.9 [, G/DL] (ref 33–37)
MCV RBC AUTO: 98 [, FL] (ref 80–94)
MONOCYTES # BLD: 0.6 [, K/UL] (ref 0–0.8)
MONOCYTES NFR BLD: 9.8 [, %] (ref 0–10)
NEUTROPHILS # BLD: 4.5 [, K/UL] (ref 1.8–7)
NEUTROPHILS NFR BLD AUTO: 71.8 [, %] (ref 50–75)
NRBC BLD AUTO-RTO: 0.1 [, %] (ref 0–2)
PLATELET # BLD: 186 [, K/UL] (ref 130–400)
PMV BLD AUTO: 8.1 [, FL] (ref 7.2–11.7)
RBC # BLD AUTO: 4.53 [, MIL/UL] (ref 4.4–5.9)
WBC # BLD AUTO: 6.2 [, K/UL] (ref 4.8–10.8)

## 2018-07-06 RX ADMIN — ENOXAPARIN SODIUM SCH MG: 40 INJECTION SUBCUTANEOUS at 09:38

## 2018-07-06 RX ADMIN — ROSUVASTATIN CALCIUM SCH MG: 5 TABLET, FILM COATED ORAL at 21:01

## 2018-07-06 NOTE — CP.PCM.PN
Subjective





- Date & Time of Evaluation


Date of Evaluation: 07/06/18


Time of Evaluation: 06:31





- Subjective


Subjective: 





Mr. Mann was seen and examined at the bedside. He is alert, oriented, but 

jokes around in answering questions. He verbalize of being in [place], however

, he was able to state that EEG testing was done and MRI questionnaires were 

asked from his wife.  He denies any headache, dizziness, lightheadedness, 

weakness. He is able to follow simple commands. He remains on telesitter for 

patient safety. There was no untoward events overnight.





Objective





- Vital Signs/Intake and Output


Vital Signs (last 24 hours): 


 











Temp Pulse Resp BP Pulse Ox


 


 99.6 F   72   20   143/63   100 


 


 07/06/18 00:00  07/06/18 00:00  07/06/18 00:00  07/06/18 00:00  07/06/18 00:00








Intake and Output: 


 











 07/05/18 07/06/18





 18:59 06:59


 


Intake Total  200


 


Output Total 300 1100


 


Balance -300 -900














- Medications


Medications: 


 Current Medications





Amlodipine Besylate (Norvasc)  5 mg PO DAILY [facility]


   Last Admin: 07/05/18 09:53 Dose:  5 mg


Aspirin (Aspirin Chewable)  81 mg PO DAILY [facility]


   Last Admin: 07/05/18 09:49 Dose:  81 mg


Enoxaparin Sodium (Lovenox)  40 mg SC DAILY [facility]


   Last Admin: 07/05/18 09:49 Dose:  40 mg


Levetiracetam (Keppra)  500 mg PO BID [facility]


   Last Admin: 07/05/18 17:18 Dose:  500 mg


Losartan Potassium (Cozaar)  100 mg PO DAILY [facility]


   Last Admin: 07/05/18 09:53 Dose:  100 mg


Rosuvastatin Calcium (Crestor)  2.5 mg PO HS [facility]


   Last Admin: 07/05/18 21:23 Dose:  2.5 mg











- Labs


Labs: 


 





 07/05/18 07:00 





 07/05/18 07:00 











- Constitutional


Appears: No Acute Distress





- Head Exam


Head Exam: NORMAL INSPECTION





- Eye Exam


Pupil Exam: PERRL





- Neurological Exam


Neurological Exam: Alert, Awake


Neuro motor strength exam: Left Upper Extremity: 5, Right Upper Extremity: 5, 

Left Lower Extremity: 5, Right Lower Extremity: 5


Additional comments: 





alert, able to answer questions, follow commands, sensation is intact.





Assessment and Plan


(1) Confusion


Assessment & Plan: 


Case discussed with Dr. Cervantes, continue current medical regimen. Pending EEG 

results  and MRI of the brain. Recommend  physical and occupational therapies, 

encourage hydration, treat any electrolytes abnormalities.


Status: Acute

## 2018-07-06 NOTE — CP.PCM.PN
Subjective





- Date & Time of Evaluation


Date of Evaluation: 07/06/18


Time of Evaluation: 13:56





- Subjective


Subjective: 





CHIEF COMPLAINTS TODAY :


patient is a periods of confusion and disorientation.





ROS.


HEENT :  N.


Resp :       No cough, wheezing ,pleuritic CP ,or hemoptysis 


Cardio :     No anginal  CP, PND, orthopnea, palpitation 


GI :           No abd.pain, n/v ,diarrhea or GI bleeding .


CNS : No headache, vertigo, focal deficit.


Musculoskel :  No joint swelling ,


Derm :        No rash 


Psych :     Normal affect.


Ext :  No  swelling ,calf pain 





PE.


Pt. is alert awake in no distress.


V.S  As noted in the chart 


Head ,ear nose,throat and eyes : Normal.


Neck : Supple with normal carotids.


Lungs: Clear air entry.


Heart : S1 & S2 normal with S4.systolic ejection murmur in the aortic area 3 /6.


Abd : Soft non tender with normal bowel sounds.


Neuro : Moves all ext. with no localized deficit.


Ext : No edema with intact pulses.Non tender calves 


Derm : No rashes or decubitus ulcer.





LABS/RADIOLOGY: carotid Dopplers is negative for any significant lesions in the 

carotid





ASSESSMENT/PLAN : 


MRI and echo pending.


EEG done report pending.





Objective





- Vital Signs/Intake and Output


Vital Signs (last 24 hours): 


 











Temp Pulse Resp BP Pulse Ox


 


 98.1 F   67   20   155/63 H  95 


 


 07/06/18 08:15  07/06/18 08:15  07/06/18 08:15  07/06/18 08:15  07/06/18 08:15








Intake and Output: 


 











 07/06/18 07/06/18





 11:59 23:59


 


Output Total 600 


 


Balance -600 














- Medications


Medications: 


 Current Medications





Amlodipine Besylate (Norvasc)  5 mg PO DAILY [facility]


   Last Admin: 07/06/18 09:38 Dose:  5 mg


Aspirin (Aspirin Chewable)  81 mg PO DAILY [facility]


   Last Admin: 07/06/18 09:37 Dose:  81 mg


Enoxaparin Sodium (Lovenox)  40 mg SC DAILY [facility]


   Last Admin: 07/06/18 09:38 Dose:  40 mg


Levetiracetam (Keppra)  500 mg PO BID [facility]


   Last Admin: 07/06/18 09:38 Dose:  500 mg


Losartan Potassium (Cozaar)  100 mg PO DAILY [facility]


   Last Admin: 07/06/18 09:38 Dose:  100 mg


Rosuvastatin Calcium (Crestor)  2.5 mg PO [facility]


   Last Admin: 07/05/18 21:23 Dose:  2.5 mg











- Labs


Labs: 


 





 07/06/18 08:07 





 07/06/18 08:07

## 2018-07-07 VITALS
TEMPERATURE: 98.6 F | HEART RATE: 77 BPM | SYSTOLIC BLOOD PRESSURE: 118 MMHG | DIASTOLIC BLOOD PRESSURE: 77 MMHG | OXYGEN SATURATION: 77 %

## 2018-07-07 RX ADMIN — ENOXAPARIN SODIUM SCH MG: 40 INJECTION SUBCUTANEOUS at 09:36

## 2018-07-07 NOTE — MRI
PROCEDURE:  MRI BRAIN WITH AND WITHOUT CONTRAST



HISTORY:

Complex partial seizure



COMPARISON:

None. 



TECHNIQUE:

Multiplanar, multisequence MR images of the brain were obtained with 

and without intravenous contrast enhancement.



FINDINGS:



HEMORRHAGE:

No acute parenchymal, subarachnoid or extra-axial hemorrhage. No 

evidence of hemosiderin deposition identified on gradient echo 

weighted sequence.



DWI:

No evidence of an acute or early subacute infarction seen on 

diffusion imaging.



BRAIN PARENCHYMA:

Mild chronic periventricular white matter ischemic changes seen 

extending peripherally into the deep white matter both cerebral 

hemispheres.  Multiple more discrete chronic appearing lacunar type 

infarcts also noted scattered about the deep and subcortical white 

matter as well as both basal nuclei the latter of which are difficult 

to distinguish from dilated perivascular spaces.  There may also be a 

few tiny dilated perivascular spaces within the brainstem versus tiny 

chronic lacunar type infarcts. 



Moderate generalized volume loss. 



ENHANCEMENT:

No evidence of enhancing parenchymal nor extra-axial masses or 

collections.  No evidence of unusual meningeal enhancement



VENTRICLES:

No obstructive hydrocephalus.



CRANIUM:

Unremarkable.



ORBITS:

Grossly unremarkable.



PARANASAL SINUSES/MASTOIDS:

Clear



VASCULAR SYSTEM:

Visualized major vascular flow voids at skull base patent 



OTHER FINDINGS:

None .



IMPRESSION:

No evidence of acute intracranial hemorrhage or infarct. Mild chronic 

white matter ischemic changes. Additionally, there are dilated 

perivascular spaces seen both basal nuclei, difficult to distinguish 

between the tiny chronic lacunar type infarcts. 



Moderate generalized volume loss. 



No enhancing masses seen.

## 2018-07-07 NOTE — CP.PCM.PN
Subjective





- Date & Time of Evaluation


Date of Evaluation: 07/07/18


Time of Evaluation: 14:10





- Subjective


Subjective: 





CHIEF COMPLAINTS TODAY :


patient is a periods of confusion and disorientation.





ROS.


HEENT :  N.


Resp :       No cough, wheezing ,pleuritic CP ,or hemoptysis 


Cardio :     No anginal  CP, PND, orthopnea, palpitation 


GI :           No abd.pain, n/v ,diarrhea or GI bleeding .


CNS : No headache, vertigo, focal deficit.


Musculoskel :  No joint swelling ,


Derm :        No rash 


Psych :     Normal affect.


Ext :  No  swelling ,calf pain 





PE.


Pt. is alert awake in no distress.


V.S  As noted in the chart 


Head ,ear nose,throat and eyes : Normal.


Neck : Supple with normal carotids.


Lungs: Clear air entry.


Heart : S1 & S2 normal with S4.systolic ejection murmur in the aortic area 3 /6.


Abd : Soft non tender with normal bowel sounds.


Neuro : Moves all ext. with no localized deficit.


Ext : No edema with intact pulses.Non tender calves 


Derm : No rashes or decubitus ulcer.





LABS/RADIOLOGY: carotid Dopplers is negative for any significant lesions in the 

carotid





ASSESSMENT/PLAN : 


echocardiogram shows normal left ventricular systolic function, with mild 

aortic stenosis valve area of 1.8 cm.


MRI of the brain is negative for any acute changes.


Continue present medications





Objective





- Vital Signs/Intake and Output


Vital Signs (last 24 hours): 


 











Temp Pulse Resp BP Pulse Ox


 


 98.8 F   72   20   110/64   97 


 


 07/07/18 05:30  07/07/18 00:00  07/07/18 00:00  07/07/18 00:00  07/07/18 00:00








Intake and Output: 


 











 07/07/18 07/07/18





 11:59 23:59


 


Intake Total 300 


 


Balance 300 














- Medications


Medications: 


 Current Medications





Amlodipine Besylate (Norvasc)  [facility]ILY [facility]


   Last Admin: 07/07/18 09:36 Dose:  5 mg


Aspirin (Aspirin Chewable)  81 mg PO DAILY [facility]


   Last Admin: 07/07/18 09:36 Dose:  81 mg


Enoxaparin Sodium (Lovenox)  40 mg SC DAILY [facility]


   Last Admin: 07/07/18 09:36 Dose:  40 mg


Levetiracetam (Keppra)  500 mg PO BID [facility]


   Last Admin: 07/07/18 09:36 Dose:  500 mg


Losartan Potassium (Cozaar)  100 mg PO DAILY [facility]


   Last Admin: 07/07/18 09:36 Dose:  100 mg


Rosuvastatin Calcium (Crestor)  2.5 mg PO [facility]


   Last Admin: 07/06/18 21:01 Dose:  2.5 mg











- Labs


Labs: 


 





 07/06/18 08:07 





 07/06/18 08:07

## 2018-07-07 NOTE — CP.PCM.PN
Subjective





- Date & Time of Evaluation


Date of Evaluation: 07/07/18


Time of Evaluation: 17:22





- Subjective


Subjective: 





Alert, awake, not confused now, no acute distress.





Objective





- Vital Signs/Intake and Output


Vital Signs (last 24 hours): 


 











Temp Pulse Resp BP Pulse Ox


 


 98.6 F   77   20   118/77   77 L


 


 07/07/18 10:00  07/07/18 10:00  07/07/18 10:00  07/07/18 10:00  07/07/18 10:00








Intake and Output: 


 











 07/07/18 07/07/18





 06:59 18:59


 


Intake Total 300 


 


Balance 300 














- Medications


Medications: 


 Current Medications





Amlodipine Besylate (Norvasc)  5 mg PO DAILY [facility]


   Last Admin: 07/07/18 09:36 Dose:  5 mg


Aspirin (Aspirin Chewable)  81 mg PO [facility]ILY [facility]


   Last Admin: 07/07/18 09:36 Dose:  81 mg


Enoxaparin Sodium (Lovenox)  40 mg SC DAILY [facility]


   Last Admin: 07/07/18 09:36 Dose:  40 mg


Levetiracetam (Keppra)  500 mg PO BID [facility]


   Last Admin: 07/07/18 09:36 Dose:  500 mg


Losartan Potassium (Cozaar)  100 mg PO DAILY [facility]


   Last Admin: 07/07/18 09:36 Dose:  100 mg


Rosuvastatin Calcium (Crestor)  2.5 mg PO HS [facility]


   Last Admin: 07/06/18 21:01 Dose:  2.5 mg











- Labs


Labs: 


 





 07/06/18 08:07 





 07/06/18 08:07 











Assessment and Plan





- Assessment and Plan (Free Text)


Assessment: 





Patient admitted with confusion dehydration, seen and examined. Awake, no acute 

distress, mentally alert and follows commands. Discussed with DR Ervin, plan 

to discharge home with family. Advised to follow up with the neurologyst in 1 

week and continue with keppra. Also follow up with PMD in 1 week.

## 2018-07-07 NOTE — CARD
--------------- APPROVED REPORT --------------





EXAM: Two-dimensional and M-mode echocardiogram with Doppler and 

color Doppler.



Other Information 

Quality : TDSRhythm : 



INDICATION

Cardiac Disease: CAD COPD 



RISK FACTORS

Hypertension 



2D DIMENSIONS 

IVSd1.3   (0.7-1.1cm)LVDd4.7   (3.9-5.9cm)

LVOT Diameter2.1   (1.8-2.4cm)PWd1.7   (0.7-1.1cm)

LVDs2.7   (2.5-4.0cm)FS (%) 42.0   %

LVEF (%)72.9   (>50%)



M-Mode DIMENSIONS 

RVDd1.25   (2.1-3.2cm)Left Atrium (MM)3.24   (2.5-4.0cm)

IVSd1.22   (0.7-1.1cm)Aortic Root2.79   (2.2-3.7cm)

LVDd4.83   (4.0-5.6cm)Aortic Cusp Exc.1.22   (1.5-2.0cm)

PWd1.07   (0.7-1.1cm)FS (%) 46   %

LVDs2.62   (2.0-3.8cm)LVEF (%)77   (>50%)



Aortic Valve

AoV Peak Vzcaeroe980.3cm/sAoV VTI48.0cmAO Peak GR.20mmHg

LVOT Peak Eschmwlf290.5cm/sLVOT VTI24.78cmAO Mean GR.10mmHg

KRISTY (VMAX)1.16hp4MKJ (VTI)1.99se4PH P 1/2 [id number]



Mitral Valve

MV E Qrvignej61.8cm/sMV A Jfoqfeyy75.8cm/sE/A ratio0.7



TDI

E/Lateral E'0.0E/Medial E'0.0



Tricuspid Valve

TR Peak [id number]/sTR Peak Gr.[id number]



 LEFT VENTRICLE 

There is borderline to mild concentric left ventricular hypertrophy.

The left ventricular systolic function is normal.

The left ventricular ejection fraction is within the normal range.

There is normal LV segmental wall motion.

Transmitral Doppler flow pattern is Grade I-abnormal relaxation 

pattern.



 RIGHT VENTRICLE 

The right ventricle is normal size.

The right ventricular systolic function is normal.



 ATRIA 

The left atrium size is normal.

The right atrium size is normal.



 AORTIC VALVE 

The aortic valve is calcified with mildly decreased systolic 

excursion. 

There is trace to mild aortic regurgitation.

There is borderline to marginal calcific valvular aortic stenosis.

Calculated aortic valve area is 1.8 cm2



 MITRAL VALVE 

The mitral valve leaflets are normal.

Mitral annular calcification is mild.

There is no mitral valve regurgitation noted.



 TRICUSPID VALVE 

The tricuspid valve is normal in structure.

There is mild tricuspid regurgitation.

Right ventricular systolic pressure is estimated at less than 30 

mmHg. 



 PULMONIC VALVE 

The pulmonary valve is normal in structure.

There is  mild pulmonic valvular regurgitation. 



 GREAT VESSELS 

The aortic root is normal in size.

The aortic root displays mild to moderate sclerocalcific changes.

The IVC is normal in size and collapses >50% with inspiration.



 PERICARDIAL EFFUSION 

There is a trace  pericardial effusion.



<Conclusion>

There is borderline to mild concentric left ventricular hypertrophy.

The left ventricular systolic function is normal. Transmitral Doppler 

flow pattern is Grade I-abnormal relaxation pattern. There is normal 

LV segmental wall motion.

The right ventricular systolic function is normal.

The aortic valve is calcified with mildly decreased systolic 

excursion. There is borderline to marginal calcific valvular aortic 

stenosis. Calculated aortic valve area is 1.8 cm2. There is trace to 

mild aortic regurgitation.

The aortic root displays mild to moderate sclerocalcific changes.

There is a trace  pericardial effusion.

## 2018-07-09 NOTE — CP.PCM.DIS
Provider





- Provider


Date of Admission: 


07/03/18 20:29





Attending physician: 


Jose Ervin MD





Time Spent in preparation of Discharge (in minutes): 353





Hospital Course





- Lab Results


Lab Results: 


 Most Recent Lab Values











WBC  6.2 K/uL (4.8-10.8)   07/06/18  08:07    


 


RBC  4.53 Mil/uL (4.40-5.90)   07/06/18  08:07    


 


Hgb  15.5 g/dL (12.0-18.0)   07/06/18  08:07    


 


Hct  44.3 % (35.0-51.0)   07/06/18  08:07    


 


MCV  98.0 fL (80.0-94.0)  H  07/06/18  08:07    


 


MCH  34.2 pg (27.0-31.0)  H  07/06/18  08:07    


 


MCHC  34.9 g/dL (33.0-37.0)   07/06/18  08:07    


 


RDW  13.2 % (11.5-14.5)   07/06/18  08:07    


 


Plt Count  186 K/uL (130-400)   07/06/18  08:07    


 


MPV  8.1 fL (7.2-11.7)   07/06/18  08:07    


 


Neut % (Auto)  71.8 % (50.0-75.0)   07/06/18  08:07    


 


Lymph % (Auto)  16.6 % (20.0-40.0)  L  07/06/18  08:07    


 


Mono % (Auto)  9.8 % (0.0-10.0)   07/06/18  08:07    


 


Eos % (Auto)  1.2 % (0.0-4.0)   07/06/18  08:07    


 


Baso % (Auto)  0.6 % (0.0-2.0)   07/06/18  08:07    


 


Neut # (Auto)  4.5 K/uL (1.8-7.0)   07/06/18  08:07    


 


Lymph # (Auto)  1.0 K/uL (1.0-4.3)   07/06/18  08:07    


 


Mono # (Auto)  0.6 K/uL (0.0-0.8)   07/06/18  08:07    


 


Eos # (Auto)  0.1 K/uL (0.0-0.7)   07/06/18  08:07    


 


Baso # (Auto)  0.0 K/uL (0.0-0.2)   07/06/18  08:07    


 


Sodium  138 mmol/L (132-148)   07/06/18  08:07    


 


Potassium  3.7 mmol/L (3.6-5.2)   07/06/18  08:07    


 


Chloride  102 mmol/L ()   07/06/18  08:07    


 


Carbon Dioxide  27 mmol/L (22-30)   07/06/18  08:07    


 


Anion Gap  13  (10-20)   07/06/18  08:07    


 


BUN  9 mg/dL (9-20)   07/06/18  08:07    


 


Creatinine  0.7 mg/dL (0.8-1.5)  L  07/06/18  08:07    


 


Est GFR ( Amer)  > 60   07/06/18  08:07    


 


Est GFR (Non-Af Amer)  > 60   07/06/18  08:07    


 


Random Glucose  94 mg/dL ()   07/06/18  08:07    


 


Calcium  9.0 mg/dl (8.6-10.4)   07/06/18  08:07    


 


Magnesium  2.8 mg/dL (1.6-2.3)  H  07/03/18  17:16    


 


Total Bilirubin  1.1 mg/dL (0.2-1.3)   07/06/18  08:07    


 


AST  28 U/L (17-59)   07/06/18  08:07    


 


ALT  29 U/L (21-72)   07/06/18  08:07    


 


Alkaline Phosphatase  122 U/L ()   07/06/18  08:07    


 


Ammonia  39 umol/L (9-33)  H  07/03/18  19:15    


 


Total Creatine Kinase  65 U/L ()   07/04/18  11:33    


 


CK-MB (Mass)  0.90 ng/mL (0.0-3.38)   07/04/18  11:33    


 


Troponin I  < 0.0120 ng/mL (0.00-0.120)   07/04/18  11:33    


 


NT-Pro-B Natriuret Pep  220 pg/mL (0-900)   07/03/18  17:16    


 


Total Protein  6.6 g/dL (6.3-8.3)   07/06/18  08:07    


 


Albumin  3.8 g/dL (3.5-5.0)   07/06/18  08:07    


 


Globulin  2.8 gm/dL (2.2-3.9)   07/06/18  08:07    


 


Albumin/Globulin Ratio  1.4  (1.0-2.1)   07/06/18  08:07    


 


TSH 3rd Generation  1.35 mIU/L (0.46-4.68)   07/05/18  07:00    


 


Urine Color  Jina  (YELLOW)   07/03/18  16:43    


 


Urine Clarity  Hazy  (Clear)   07/03/18  16:43    


 


Urine pH  5.0  (5.0-8.0)   07/03/18  16:43    


 


Ur Specific Gravity  1.021  (1.003-1.030)   07/03/18  16:43    


 


Urine Protein  2+ mg/dL (NEGATIVE)  H  07/03/18  16:43    


 


Urine Glucose (UA)  Normal mg/dL (Normal)   07/03/18  16:43    


 


Urine Ketones  Negative mg/dL (NEGATIVE)   07/03/18  16:43    


 


Urine Blood  3+  (NEGATIVE)  H  07/03/18  16:43    


 


Urine Nitrate  Negative  (NEGATIVE)   07/03/18  16:43    


 


Urine Bilirubin  Negative  (NEGATIVE)   07/03/18  16:43    


 


Urine Urobilinogen  2.0 mg/dL (0.2-1.0)   07/03/18  16:43    


 


Ur Leukocyte Esterase  Neg Drea/uL (Negative)   07/03/18  16:43    


 


Urine WBC (Auto)  5 /hpf (0-5)   07/03/18  16:43    


 


Urine RBC (Auto)  274 /hpf (0-3)  H  07/03/18  16:43    


 


Ur Squamous Epith Cells  3 /hpf (0-5)   07/03/18  16:43    


 


Urine Bacteria  Occ  (<OCC)  H  07/03/18  16:43    


 


Hyaline Casts  >20 /lpf (0-2)  H  07/03/18  16:43    














- Hospital Course


Hospital Course: 





Patient is admitted from our emergency room with 2 episodes of confusion and 

disorientation.


According to the family patient had 2 episodes of confusion disorientation 

transiently.  Last evening patient was entering the [employer] and declared that he 

was entering the house.  Patient currently has no neuro deficits and there is 

no evidence of any tonic-clonic seizures


   


HPI


History of hypertension no history of coronary artery disease MI or diabetes or 

previous history of stroke.





patient had a cardio neuro workup


Complete neurological workup CAT scan carotid ultrasound and MRI of the brain 

and EEG were normal


Echocardiogram showed normal left ventricular systolic function with mild 

aortic stenosis with valve area of 1.8 cm


Patient was anxious to go home and the family agreed that patient should be 

discha  Patient will continue his home medication








Discharge Exam





- Head Exam


Head Exam: NORMAL INSPECTION





Discharge Plan





- Discharge Medications


Prescriptions: 


levETIRAcetam [Keppra] 500 mg PO BID #60 tab





- Follow Up Plan


Condition: FAIR


Disposition: HOME/ ROUTINE


Instructions:  Acute Hemoptysis (DC), Hemoptysis (GEN)

## 2018-08-07 ENCOUNTER — HOSPITAL ENCOUNTER (EMERGENCY)
Dept: HOSPITAL 31 - C.ER | Age: 78
LOS: 1 days | Discharge: HOME | End: 2018-08-08
Payer: MEDICARE

## 2018-08-07 VITALS — BODY MASS INDEX: 33.4 KG/M2

## 2018-08-07 VITALS — TEMPERATURE: 98.2 F | RESPIRATION RATE: 16 BRPM
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
## 2018-08-07 DIAGNOSIS — F10.129: ICD-10-CM
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
## 2018-08-07 DIAGNOSIS — S00.81XA: Primary | ICD-10-CM

## 2018-08-07 NOTE — C.PDOC
History Of Present Illness


Patient presents stating he has been drinking, he stumbled while walking and 

hit his chin PTA. Patient remembers the event, denies LOC or other injuries.


Time Seen by Provider: 08/07/18 22:56


Chief Complaint (Nursing): Abnormal Skin Integrity


History Per: Patient


History/Exam Limitations: no limitations


Onset/Duration Of Symptoms: Hrs


Current Symptoms Are (Timing): Still Present


Suicide/Self Injury Attempted (Context): None


Modifying Factor(s): Alcohol


Severity: None


Pain Scale Rating Of: 0


Associated Symptoms: denies: Depression, Suicidal Thoughts


Involuntary Hold By: None


Recent travel outside of the [place]: No





Past Medical History


Reviewed: Historical Data, Nursing Documentation, Vital Signs


Vital Signs: 


 Last Vital Signs











Temp  98.2 F   08/07/18 22:45


 


Pulse  90   08/07/18 22:45


 


Resp  16   08/07/18 22:45


 


BP  104/67   08/07/18 22:45


 


Pulse Ox  94 L  08/07/18 23:10














- Medical History


PMH: Cardia Arrhythmia, COPD, Emphysema, HTN, Hypercholesterolemia, Sleep Apnea 

(HAS C-PAP DOESN'T USE)


   Denies: Chronic Kidney Disease


Surgical History: Coronary Stent (X2)





- [facility] Procedures








CORONARY ARTERIOGRAM NEC (08/26/15)


LEFT HEART CARDIAC CATH (08/26/15)








Family History: States: No Known Family Hx





- Social History


Hx Tobacco Use: No


Hx Alcohol Use: Yes (Socially)


Hx Substance Use: No





- Immunization History


Hx Tetanus Toxoid Vaccination: No


Hx Influenza Vaccination: No


Hx Pneumococcal Vaccination: No





Review Of Systems


Musculoskeletal: Negative for: Neck Pain


Skin: Positive for: Other (Abrasion)


Neurological: Negative for: Headache, Other (LOC)





Physical Exam





- Physical Exam


Appears: Non-toxic


Skin: Warm, Dry


Head: Normacephalic, Abrasion (1cm superficial on center of chin, no active 

bleeding)


Eye(s): bilateral: Normal Inspection


Oral Mucosa: Moist


Neck: Trachea Midline, No Midline Cervical Tenderness, No Paracervical 

Tenderness, Supple


Chest: Symmetrical, No Tenderness


Cardiovascular: Rhythm Regular


Respiratory: No Rales, No Rhonchi, No Wheezing


Gastrointestinal/Abdominal: Soft, No Tenderness


Back: No Vertebral Tenderness, No Paraspinal Tenderness


Neurological/Psych: Oriented x3





ED Course And Treatment


O2 Sat by Pulse Oximetry: 94 (Room air)


Pulse Ox Interpretation: Normal


Progress Note: son came to pick the pt up. Pt is clinically sober


Reevaluation Time: 00:55


Reassessment Condition: Improved





Disposition


Counseled Patient/Family Regarding: Studies Performed, Diagnosis, Need For 

Followup





- Disposition


[facility] 


[facility]


[facility] [Outside]


Disposition: HOME/ ROUTINE


Disposition Time: 22:57


Condition: FAIR


Additional Instructions: 


Please return if symptoms  recur


Instructions:  Wound Care (DC), Skin Abrasions (DC)


Forms:  [facility] (English)





- Clinical Impression


Clinical Impression: 


 Alcohol intoxication, Abrasion of chin








- Scribe Statement


The provider has reviewed the documentation as recorded by the Scribernst Leung





All medical record entries made by the Sueibernst were at my direction and 

personally dictated by me. I have reviewed the chart and agree that the record 

accurately reflects my personal performance of the history, physical exam, 

medical decision making, and the department course for this patient. I have 

also personally directed, reviewed, and agree with the discharge instructions 

and disposition.

## 2018-08-08 VITALS — SYSTOLIC BLOOD PRESSURE: 116 MMHG | HEART RATE: 80 BPM | DIASTOLIC BLOOD PRESSURE: 71 MMHG | OXYGEN SATURATION: 95 %

## 2018-12-03 ENCOUNTER — HOSPITAL ENCOUNTER (EMERGENCY)
Dept: HOSPITAL 31 - C.ER | Age: 78
Discharge: HOME | End: 2018-12-03
Payer: MEDICARE

## 2018-12-03 VITALS
DIASTOLIC BLOOD PRESSURE: 91 MMHG | SYSTOLIC BLOOD PRESSURE: 146 MMHG | OXYGEN SATURATION: 98 % | TEMPERATURE: 98.6 F | RESPIRATION RATE: 20 BRPM | HEART RATE: 79 BPM

## 2018-12-03 VITALS — BODY MASS INDEX: 33.4 KG/M2

## 2018-12-03 DIAGNOSIS — S93.402A: Primary | ICD-10-CM
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
## 2018-12-03 PROCEDURE — 73610 X-RAY EXAM OF ANKLE: CPT

## 2018-12-03 PROCEDURE — 97116 GAIT TRAINING THERAPY: CPT

## 2018-12-03 PROCEDURE — 97161 PT EVAL LOW COMPLEX 20 MIN: CPT

## 2018-12-03 PROCEDURE — 99285 EMERGENCY DEPT VISIT HI MDM: CPT

## 2018-12-03 NOTE — RAD
PROCEDURE:  Left Ankle Radiographs.



HISTORY:

pain to left ankle s.p twisting injury 2days ago 



COMPARISON:

None available.



FINDINGS:



BONES:

Osseous demineralization limits evaluation for acute fracture lines.  

No acute displaced fracture. 



JOINTS:

No dislocation. 



SOFT TISSUES:

Vascular calcifications.  Soft tissue swelling.  No evidence of 

radiopaque foreign body. 



OTHER FINDINGS:

None.



IMPRESSION:

Soft tissue swelling. 



No acute displaced fracture or dislocation identified. 



If symptoms persist or if there is clinical concern, x-ray follow-up 

in 7-10 days should be considered.

## 2018-12-03 NOTE — C.PDOC
History Of Present Illness


Patient complains of pain and swelling to left ankle after twisting injury 2 

days ago at home. Patient states he did not take any analgesics or any care at 

home. He reports ankle progressively appears more swollen and is having 

difficulty ambulating because of pain. Denies other injury, numbness, weakness, 

calf pain, knee pain.


Time Seen by Provider: 12/03/18 12:03


Chief Complaint (Nursing): Lower Extremity Problem/Injury


History Per: Patient


History/Exam Limitations: no limitations


Onset/Duration Of Symptoms: Days


Current Symptoms Are (Timing): Still Present


Severity: Moderate





- Ankle/Foot


Description Of Injury: Twisted





Past Medical History


Reviewed: Historical Data, Nursing Documentation, Vital Signs


Vital Signs: 





                                Last Vital Signs











Temp  98.9 F   12/03/18 11:49


 


Pulse  84   12/03/18 11:49


 


Resp  18   12/03/18 11:49


 


BP  145/84   12/03/18 11:49


 


Pulse Ox  99   12/03/18 11:49














- Medical History


PMH: Cardia Arrhythmia, COPD, Emphysema, HTN, Hypercholesterolemia, Sleep Apnea 

(HAS C-PAP DOESN'T USE)


Surgical History: Coronary Stent (X2)





- [facility] Procedures











CORONARY ARTERIOGRAM NEC (08/26/15)


LEFT HEART CARDIAC CATH (08/26/15)








Family History: States: Unknown Family Hx





- Social History


Hx Tobacco Use: No


Hx Alcohol Use: Yes (Socially)


Hx Substance Use: No





- Immunization History


Hx Tetanus Toxoid Vaccination: No


Hx Influenza Vaccination: No


Hx Pneumococcal Vaccination: Yes (2018)





Review Of Systems


Except As Marked, All Systems Reviewed And Found Negative.


Musculoskeletal: Positive for: Other (Ankle pain left)





Physical Exam





- Physical Exam


Appears: Non-toxic, No Acute Distress


Skin: Warm, Dry, Ecchymosis (left medial ankle)


Head: Atraumatic, Normacephalic


Eye(s): bilateral: Normal Inspection


Chest: Symmetrical


Extremity: Left: Other (moderate swelling to ankle with tenderness below lateral

and medial malleolus, ecchymosis to medial ankle. Foot mildly swollen no focal 

tenderness, Toes nontender. ), Right: Atraumatic, Normal Color And Temperature, 

Normal ROM


Neurological/Psych: Oriented x3, Normal Speech


Gait: Unable To Assess





ED Course And Treatment


O2 Sat by Pulse Oximetry: 99





Medical Decision Making


Medical Decision Making: 





Impression: Ankle injury


Plan:


* Xray left ankle


* Motrin


Progress:


Xray viewed by me and shows no acute fracture or dislocation. Ace bandage and 

aircast splint applied by CP. 


Physical therapist instructed on crutch training





Disposition


Counseled Patient/Family Regarding: Diagnosis, Need For Followup, Rx Given





- Disposition


Referrals: 


Roma Romero MD [Staff Provider] - 


Disposition: HOME/ ROUTINE


Disposition Time: 14:47


Condition: GOOD


Additional Instructions: 


Your xray was normal, no fracture. Please apply ice to area 15 minutes three 

times a day. Take Motrin as needed for pain every 6 hours, with food to not 

upset stomach. Follow up with orthopedic if pain persists over one week. 





Tu radiografa era normal, sin fracturas. Por favor aplique hielo en el yojana 15 

minutos neo veces al da. [place] Motrin segn sea necesario para el dolor cada 6 

horas, con alimentos para no alterar el estmago. Contine con la ortopedia si 

el dolor persiste aruna macey semana.


Prescriptions: 


Ibuprofen [Motrin] 600 mg PO Q8 #30 tab


Instructions:  Ankle Sprain (DC)


Print Language: Italian





- POA


Present On Arrival: None





- Clinical Impression


Clinical Impression: 


 Ankle sprain

## 2019-01-13 ENCOUNTER — HOSPITAL ENCOUNTER (EMERGENCY)
Dept: HOSPITAL 31 - C.ER | Age: 79
LOS: 1 days | Discharge: HOME | End: 2019-01-14
Payer: MEDICARE

## 2019-01-13 VITALS — BODY MASS INDEX: 33.4 KG/M2

## 2019-01-13 DIAGNOSIS — Z95.5: ICD-10-CM

## 2019-01-13 DIAGNOSIS — R45.1: ICD-10-CM
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
## 2019-01-13 DIAGNOSIS — F10.129: Primary | ICD-10-CM

## 2019-01-13 DIAGNOSIS — I10: ICD-10-CM

## 2019-01-13 DIAGNOSIS — Z87.891: ICD-10-CM

## 2019-01-13 DIAGNOSIS — E78.00: ICD-10-CM

## 2019-01-13 PROCEDURE — 80053 COMPREHEN METABOLIC PANEL: CPT

## 2019-01-13 PROCEDURE — 84100 ASSAY OF PHOSPHORUS: CPT

## 2019-01-13 PROCEDURE — 82948 REAGENT STRIP/BLOOD GLUCOSE: CPT

## 2019-01-13 PROCEDURE — 81001 URINALYSIS AUTO W/SCOPE: CPT

## 2019-01-13 PROCEDURE — 99285 EMERGENCY DEPT VISIT HI MDM: CPT

## 2019-01-13 PROCEDURE — 85025 COMPLETE CBC W/AUTO DIFF WBC: CPT

## 2019-01-13 PROCEDURE — 96372 THER/PROPH/DIAG INJ SC/IM: CPT

## 2019-01-13 PROCEDURE — 83735 ASSAY OF MAGNESIUM: CPT

## 2019-01-13 NOTE — C.PDOC
History Of Present Illness


78 year old male with PMHx of alcohol abuse and confusion is brought to the ED 

by EMS for alcohol intoxication. Patient was picked up due to being grossly 

intoxicated. Patient was admitted on 07/20/18 for confusion, patient had negat

erik workup done at that time. Patient's history limited due to acute 

intoxication. Patient states he wants to go home. Patient denies SI/HI, 

hallucinations, medical complaints. 





7/2018 Complete neurological workup CAT scan carotid ultrasound and MRI of the 

brain and EEG were normal


Echocardiogram showed normal left ventricular systolic function with mild aortic

stenosis with valve area of 1.8 cm





<EmigdioSteffany - Last Filed: 01/14/19 00:24>


History Per: Patient, EMS


History/Exam Limitations: intoxication


Onset/Duration Of Symptoms: Hrs


Current Symptoms Are (Timing): Still Present


Associated Symptoms: denies: Depression, Suicidal Thoughts, Suicidal Plan


Recent travel outside of the [place]: No


Additional History Per: Patient, EMS





<EmigdioSteffany - Last Filed: 01/14/19 00:24>





<Jaison Rodriguez - Last Filed: 01/14/19 06:16>


Time Seen by Provider: 01/13/19 20:21


Chief Complaint (Nursing): Substance Abuse





Past Medical History


Reviewed: Historical Data, Nursing Documentation, Vital Signs


Vital Signs: 





                                Last Vital Signs











Temp  97.3 F L  01/13/19 20:13


 


Pulse  69   01/13/19 20:13


 


Resp  16   01/13/19 20:13


 


BP  137/79   01/13/19 20:13


 


Pulse Ox  96   01/13/19 20:13














- Medical History


PMH: Cardia Arrhythmia, COPD, Emphysema, HTN, Hypercholesterolemia, Sleep Apnea 

(HAS C-PAP DOESN'T USE)


Surgical History: Coronary Stent (X2)





- [facility] Procedures











CORONARY ARTERIOGRAM NEC (08/26/15)


LEFT HEART CARDIAC CATH (08/26/15)








Family History: States: Unknown Family Hx





- Social History


Hx Tobacco Use: No


Hx Alcohol Use: Yes (Socially)


Hx Substance Use: No





- Immunization History


Hx Tetanus Toxoid Vaccination: No


Hx Influenza Vaccination: No


Hx Pneumococcal Vaccination: Yes (2018)





<EmigdioSteffany - Last Filed: 01/14/19 00:24>


Vital Signs: 





                                Last Vital Signs











Temp  97.3 F L  01/13/19 20:13


 


Pulse  72   01/13/19 22:41


 


Resp  17   01/14/19 03:47


 


BP  154/75 H  01/14/19 03:47


 


Pulse Ox  96   01/14/19 03:47














- [facility] Procedures











CORONARY ARTERIOGRAM NEC (08/26/15)


LEFT HEART CARDIAC CATH (08/26/15)











<Jaison Rodriguez RUBY - Last Filed: 01/14/19 06:16>





Review Of Systems


Constitutional: Negative for: Fever, Chills


Cardiovascular: Negative for: Chest Pain


Respiratory: Negative for: Shortness of Breath


Gastrointestinal: Negative for: Nausea, Vomiting, Abdominal Pain


Neurological: Negative for: Weakness, Numbness


Psych: Negative for: Depression, Suicidal ideation





<Steffany Beal - Last Filed: 01/14/19 00:24>





Physical Exam





- Physical Exam


Appears: Non-toxic, Other (intoxicated)


Skin: Normal Color, Warm, Dry


Head: Atraumatic, Normacephalic, No Laceration


Eye(s): bilateral: Normal Inspection, PERRL, EOMI


Oral Mucosa: Moist


Neck: Normal ROM, Supple


Chest: Symmetrical


Cardiovascular: Rhythm Regular


Respiratory: Normal Breath Sounds, No Rales, No Rhonchi, No Wheezing


Gastrointestinal/Abdominal: Soft, No Tenderness, No Guarding, No Rebound


Extremity: Bilateral: Atraumatic, Normal Color And Temperature, Normal ROM


Neurological/Psych: No Oriented x3 (AOx2), Other (acute intoxication)





<Steffany Beal Last Filed: 01/14/19 00:24>





ED Course And Treatment


O2 Sat by Pulse Oximetry: 96 (ON RA)


Pulse Ox Interpretation: Normal





<Steffany Beal Last Filed: 01/14/19 00:24>





- Laboratory Results


Result Diagrams: 


                                 01/14/19 01:18





                                 01/14/19 01:18


Lab Results: 





                                        











Total Bilirubin  0.5 mg/dL (0.2-1.3)   01/14/19  01:18    


 


AST  21 U/L (17-59)   01/14/19  01:18    


 


ALT  21 U/L (21-72)  D 01/14/19  01:18    


 


Alkaline Phosphatase  73 U/L ()   01/14/19  01:18    


 


Total Protein  6.8 g/dL (6.3-8.3)   01/14/19  01:18    


 


Albumin  4.0 g/dL (3.5-5.0)   01/14/19  01:18    


 


Globulin  2.9 gm/dL (2.2-3.9)   01/14/19  01:18    


 


Albumin/Globulin Ratio  1.4  (1.0-2.1)   01/14/19  01:18    








                                        











Urine Color  Straw  (YELLOW)   01/14/19  01:18    


 


Urine Clarity  Clear  (Clear)   01/14/19  01:18    


 


Urine pH  6.0  (5.0-8.0)   01/14/19  01:18    


 


Ur Specific Gravity  1.003  (1.003-1.030)   01/14/19  01:18    


 


Urine Protein  Negative mg/dL (NEGATIVE)   01/14/19  01:18    


 


Urine Glucose (UA)  Normal mg/dL (Normal)   01/14/19  01:18    


 


Urine Ketones  Negative mg/dL (NEGATIVE)   01/14/19  01:18    


 


Urine Blood  Negative  (NEGATIVE)   01/14/19  01:18    


 


Urine Nitrate  Negative  (NEGATIVE)   01/14/19  01:18    


 


Urine Bilirubin  Negative  (NEGATIVE)   01/14/19  01:18    


 


Urine Urobilinogen  Normal mg/dL (0.2-1.0)   01/14/19  01:18    


 


Ur Leukocyte Esterase  Neg Drea/uL (Negative)   01/14/19  01:18    


 


Urine WBC (Auto)  1 /hpf (0-5)   01/14/19  01:18    


 


Urine RBC (Auto)  < 1 /hpf (0-3)   01/14/19  01:18    














<Jaison Rodriguez R - Last Filed: 01/14/19 06:16>





Progress





- Re-Evaluation


Re-evaluation Note: 





01/13/19 21:22


PT INCREASINGLY COMBATIVE, UNCOOPERATIVE W RN AND SECURITY. CLIMBING OUT OF 

STRETCHER, TRYING TO HIT STAFF AND MAKING CONTINUED THREATS "TO PUNCH YOU IN THE

FACE"





PER CHARGE RN, FAMILY STATES UNABLE TO GET PATIENT "BC THEY LIVE TOO FAR" 

REFUSING TO TAKE PATIENT HOME. 





D/W PT WIFE KATHERINE CUMMINGS [phone]: ADVISED PT STATUS AND ER MGMT PLAN. STATES

PT DRINKS DAILY, IS BASELINE CONFUSED AND BECOMES AGITATED WHEN DRUNK. STATES 

UNABLE TO TAKE CARE OF PT @ HOME IN HIS CURRENT STATE AND UNABLE TO GET PT DUE 

TO HER POST-OP CONDITION. WIFE STATES WILL  PT IN MORNING.





PT D/W WIFE ON CELL PHONE


01/13/19 23:19


sleeping nard vss.





- Data Reviewed


Data Reviewed: Old records





<Steffany Beal - Last Filed: 01/14/19 00:24>





Disposition


Counseled Patient/Family Regarding: Diagnosis





- Disposition


Disposition Time: 00:24





<Steffany Beal - Last Filed: 01/14/19 00:24>





- Disposition


Disposition Time: 06:16





<Jaison Rodriguez - Last Filed: 01/14/19 06:16>





- Disposition


Referrals: 


[facility] [Outside]


Disposition: HOME/ ROUTINE


Condition: STABLE


Instructions:  Alcohol Abuse and Alcoholism (DC)


Forms:  [facility] (English)





- Clinical Impression


Clinical Impression: 


 Alcohol intoxication, Agitation








- Scribe Statement


The provider has reviewed the documentation as recorded by the Scribe


Aguilar Renee





All medical record entries made by the Scribe were at my direction and 

personally dictated by me. I have reviewed the chart and agree that the record 

accurately reflects my personal performance of the history, physical exam, 

medical decision making, and the department course for this patient. I have also

 personally directed, reviewed, and agree with the discharge instructions and 

disposition.





<Steffany Beal - Last Filed: 01/14/19 00:24>





Physician Patient Turnover


Patient Signed Over To: Jaison Rodriguez


Handoff Comments: Pending sobriety, and dispo





<EmigdioSteffany - Last Filed: 01/14/19 00:24>

## 2019-01-14 VITALS
HEART RATE: 84 BPM | SYSTOLIC BLOOD PRESSURE: 184 MMHG | OXYGEN SATURATION: 99 % | DIASTOLIC BLOOD PRESSURE: 92 MMHG | RESPIRATION RATE: 20 BRPM | TEMPERATURE: 97.5 F

## 2019-01-14 LAB
ALBUMIN SERPL-MCNC: 4 G/DL (ref 3.5–5)
ALBUMIN/GLOB SERPL: 1.4 {RATIO} (ref 1–2.1)
ALT SERPL-CCNC: 21 U/L (ref 21–72)
AST SERPL-CCNC: 21 U/L (ref 17–59)
BASOPHILS # BLD AUTO: 0 K/UL (ref 0–0.2)
BASOPHILS NFR BLD: 0.9 % (ref 0–2)
BILIRUB UR-MCNC: NEGATIVE MG/DL
BUN SERPL-MCNC: 11 MG/DL (ref 9–20)
CALCIUM SERPL-MCNC: 8.6 MG/DL (ref 8.6–10.4)
EOSINOPHIL # BLD AUTO: 0 K/UL (ref 0–0.7)
EOSINOPHIL NFR BLD: 0.3 % (ref 0–4)
ERYTHROCYTE [DISTWIDTH] IN BLOOD BY AUTOMATED COUNT: 14 % (ref 11.5–14.5)
GFR NON-AFRICAN AMERICAN: > 60
GLUCOSE UR STRIP-MCNC: NORMAL MG/DL
HGB BLD-MCNC: 15.6 G/DL (ref 12–18)
LEUKOCYTE ESTERASE UR-ACNC: (no result) LEU/UL
LYMPHOCYTES # BLD AUTO: 1.3 K/UL (ref 1–4.3)
LYMPHOCYTES NFR BLD AUTO: 24.3 % (ref 20–40)
MCH RBC QN AUTO: 33.1 PG (ref 27–31)
MCHC RBC AUTO-ENTMCNC: 33.3 G/DL (ref 33–37)
MCV RBC AUTO: 99.5 FL (ref 80–94)
MONOCYTES # BLD: 0.5 K/UL (ref 0–0.8)
MONOCYTES NFR BLD: 9.2 % (ref 0–10)
NEUTROPHILS # BLD: 3.6 K/UL (ref 1.8–7)
NEUTROPHILS NFR BLD AUTO: 65.3 % (ref 50–75)
NRBC BLD AUTO-RTO: 0 % (ref 0–2)
PH UR STRIP: 6 [PH] (ref 5–8)
PLATELET # BLD: 161 K/UL (ref 130–400)
PMV BLD AUTO: 7.6 FL (ref 7.2–11.7)
PROT UR STRIP-MCNC: NEGATIVE MG/DL
RBC # BLD AUTO: 4.7 MIL/UL (ref 4.4–5.9)
RBC # UR STRIP: NEGATIVE /UL
SP GR UR STRIP: 1 (ref 1–1.03)
UROBILINOGEN UR-MCNC: NORMAL MG/DL (ref 0.2–1)
WBC # BLD AUTO: 5.4 K/UL (ref 4.8–10.8)

## 2019-02-10 ENCOUNTER — HOSPITAL ENCOUNTER (EMERGENCY)
Dept: HOSPITAL 31 - C.ER | Age: 79
Discharge: HOME | End: 2019-02-10
Payer: MEDICARE

## 2019-02-10 VITALS
HEART RATE: 67 BPM | OXYGEN SATURATION: 96 % | SYSTOLIC BLOOD PRESSURE: 133 MMHG | TEMPERATURE: 97.5 F | DIASTOLIC BLOOD PRESSURE: 76 MMHG | RESPIRATION RATE: 18 BRPM

## 2019-02-10 VITALS — BODY MASS INDEX: 33.4 KG/M2

## 2019-02-10 VITALS
OXYGEN SATURATION: 93 % | HEART RATE: 69 BPM | TEMPERATURE: 97.9 F | DIASTOLIC BLOOD PRESSURE: 61 MMHG | SYSTOLIC BLOOD PRESSURE: 114 MMHG

## 2019-02-10 VITALS — RESPIRATION RATE: 16 BRPM
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
## 2019-02-10 DIAGNOSIS — Z23: ICD-10-CM

## 2019-02-10 DIAGNOSIS — E78.00: ICD-10-CM

## 2019-02-10 DIAGNOSIS — S50.311A: ICD-10-CM

## 2019-02-10 DIAGNOSIS — I10: ICD-10-CM
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
## 2019-02-10 DIAGNOSIS — T14.8XXA: Primary | ICD-10-CM

## 2019-02-10 DIAGNOSIS — S80.212A: Primary | ICD-10-CM
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
## 2019-02-10 LAB
ALBUMIN SERPL-MCNC: 4.4 G/DL (ref 3.5–5)
ALBUMIN/GLOB SERPL: 1.6 {RATIO} (ref 1–2.1)
ALT SERPL-CCNC: 11 U/L (ref 21–72)
AST SERPL-CCNC: 26 U/L (ref 17–59)
BASOPHILS # BLD AUTO: 0.1 K/UL (ref 0–0.2)
BASOPHILS NFR BLD: 1.3 % (ref 0–2)
BILIRUB UR-MCNC: NEGATIVE MG/DL
BUN SERPL-MCNC: 13 MG/DL (ref 9–20)
CALCIUM SERPL-MCNC: 8.8 MG/DL (ref 8.6–10.4)
EOSINOPHIL # BLD AUTO: 0 K/UL (ref 0–0.7)
EOSINOPHIL NFR BLD: 0.4 % (ref 0–4)
ERYTHROCYTE [DISTWIDTH] IN BLOOD BY AUTOMATED COUNT: 14.2 % (ref 11.5–14.5)
GFR NON-AFRICAN AMERICAN: > 60
GLUCOSE UR STRIP-MCNC: NORMAL MG/DL
HGB BLD-MCNC: 15.8 G/DL (ref 12–18)
LEUKOCYTE ESTERASE UR-ACNC: (no result) LEU/UL
LYMPHOCYTES # BLD AUTO: 1.4 K/UL (ref 1–4.3)
LYMPHOCYTES NFR BLD AUTO: 21.2 % (ref 20–40)
MCH RBC QN AUTO: 33.3 PG (ref 27–31)
MCHC RBC AUTO-ENTMCNC: 33.6 G/DL (ref 33–37)
MCV RBC AUTO: 99.1 FL (ref 80–94)
MONOCYTES # BLD: 0.5 K/UL (ref 0–0.8)
MONOCYTES NFR BLD: 8.1 % (ref 0–10)
NEUTROPHILS # BLD: 4.6 K/UL (ref 1.8–7)
NEUTROPHILS NFR BLD AUTO: 69 % (ref 50–75)
NRBC BLD AUTO-RTO: 0 % (ref 0–2)
PH UR STRIP: 6 [PH] (ref 5–8)
PLATELET # BLD: 190 K/UL (ref 130–400)
PMV BLD AUTO: 7.7 FL (ref 7.2–11.7)
PROT UR STRIP-MCNC: NEGATIVE MG/DL
RBC # BLD AUTO: 4.76 MIL/UL (ref 4.4–5.9)
RBC # UR STRIP: NEGATIVE /UL
SP GR UR STRIP: 1 (ref 1–1.03)
UROBILINOGEN UR-MCNC: NORMAL MG/DL (ref 0.2–1)
WBC # BLD AUTO: 6.6 K/UL (ref 4.8–10.8)

## 2019-02-10 PROCEDURE — 82550 ASSAY OF CK (CPK): CPT

## 2019-02-10 PROCEDURE — 90471 IMMUNIZATION ADMIN: CPT

## 2019-02-10 PROCEDURE — 81001 URINALYSIS AUTO W/SCOPE: CPT

## 2019-02-10 PROCEDURE — 87086 URINE CULTURE/COLONY COUNT: CPT

## 2019-02-10 PROCEDURE — 82948 REAGENT STRIP/BLOOD GLUCOSE: CPT

## 2019-02-10 PROCEDURE — 85025 COMPLETE CBC W/AUTO DIFF WBC: CPT

## 2019-02-10 PROCEDURE — 96372 THER/PROPH/DIAG INJ SC/IM: CPT

## 2019-02-10 PROCEDURE — 80053 COMPREHEN METABOLIC PANEL: CPT

## 2019-02-10 PROCEDURE — 99285 EMERGENCY DEPT VISIT HI MDM: CPT

## 2019-02-10 PROCEDURE — 90715 TDAP VACCINE 7 YRS/> IM: CPT

## 2019-02-10 NOTE — C.PDOC
History Of Present Illness


78 year old male presents to the ED BIBA for an evaluation status post fall. 

Reports he tripped and fell on the street. Reports he feels fine. Denies any 

LOC, head injury, weakness, numbness, dizziness, headache, nausea, or vomiting. 

Denies any further evaluation. 








- HPI


Time Seen by Provider: 02/10/19 16:55


Chief Complaint (Nursing): Syncope


History Per: Patient, EMS


History/Exam Limitations: no limitations


Onset/Duration Of Symptoms: Mins


Injury Occurred (Timing): Just Before Arrival





- Fall


Fall:Prior To Injury: Tripped





Past Medical History


Reviewed: Historical Data, Nursing Documentation, Vital Signs


Vital Signs: 





                                Last Vital Signs











Temp  97.9 F   02/10/19 16:40


 


Pulse  69   02/10/19 16:40


 


Resp  21   02/10/19 16:40


 


BP  114/61   02/10/19 16:40


 


Pulse Ox  93 L  02/10/19 16:40














- Medical History


PMH: Cardia Arrhythmia, COPD, Emphysema, HTN, Hypercholesterolemia, Sleep Apnea 

(HAS C-PAP DOESN'T USE)


Surgical History: Coronary Stent (X2)





- [facility] Procedures











CORONARY ARTERIOGRAM NEC (08/26/15)


LEFT HEART CARDIAC CATH (08/26/15)








Family History: States: No Known Family Hx





- Social History


Hx Tobacco Use: No


Hx Alcohol Use: Yes (Socially)


Hx Substance Use: No





- Immunization History


Hx Tetanus Toxoid Vaccination: No


Hx Influenza Vaccination: No


Hx Pneumococcal Vaccination: Yes (2018)





Review Of Systems


Except As Marked, All Systems Reviewed And Found Negative.


Gastrointestinal: Negative for: Nausea, Vomiting


Neurological: Negative for: Weakness, Numbness, Headache, Dizziness, Other (LOC)





Physical Exam





- Physical Exam


Appears: Non-toxic, No Acute Distress


Skin: Warm, Dry


Head: Atraumatic, Normacephalic


Eye(s): bilateral: Normal Inspection, PERRL, EOMI


Nose: Normal


Oral Mucosa: Moist


Neck: Supple


Chest: Symmetrical


Cardiovascular: Rhythm Regular


Respiratory: No Decreased Breath Sounds, Other (NARD)


Extremity: Normal ROM


Neurological/Psych: Oriented x3, Normal Speech, Other (no acute intoxication)


Gait: Steady





ED Course And Treatment


O2 Sat by Pulse Oximetry: 93 (RA)


Pulse Ox Interpretation: Abnormal





Disposition


Counseled Patient/Family Regarding: Studies Performed, Diagnosis





- Disposition


Referrals: 


YOUR,PMD [Other]


Disposition: HOME/ ROUTINE


Disposition Time: 17:09


Condition: GOOD


Instructions:  Contusion (DC)


Forms:  [facility] (English)





- Clinical Impression


Clinical Impression: 


 Contusion








- Scribe Statement


The provider has reviewed the documentation as recorded by the Scribe


Yanique Garcia








All medical record entries made by the Scribe were at my direction and 

personally dictated by me. I have reviewed the chart and agree that the record 

accurately reflects my personal performance of the history, physical exam, 

medical decision making, and the department course for this patient. I have also

personally directed, reviewed, and agree with the discharge instructions and 

disposition.

## 2019-02-10 NOTE — C.PDOC
History Of Present Illness


77 y/o M c PMHx dementia, alcohol use p/w confusion. Patient was in this ED 

earlier today, discharged, brought in about 1 hour later by 's office 

having found patient on floor with abrasions. Patient uncooperative. Full HPI/RO

S unobtainable.


Time Seen by Provider: 02/10/19 18:52


Chief Complaint (Nursing): Altered Mental Status





Past Medical History


Vital Signs: 





                                Last Vital Signs











Temp  97.5 F L  02/10/19 18:37


 


Pulse  67   02/10/19 18:37


 


Resp  18   02/10/19 18:37


 


BP  133/76   02/10/19 18:37


 


Pulse Ox  96   02/10/19 18:37














- Medical History


PMH: Cardia Arrhythmia, COPD, Emphysema, HTN, Hypercholesterolemia, Sleep Apnea 

(HAS C-PAP DOESN'T USE)


Surgical History: Coronary Stent (X2)





- [facility] Procedures











CORONARY ARTERIOGRAM NEC (08/26/15)


LEFT HEART CARDIAC CATH (08/26/15)








Family History: States: Unknown Family Hx





- Social History


Hx Tobacco Use: No


Hx Alcohol Use: Yes (Socially)


Hx Substance Use: No





- Immunization History


Hx Tetanus Toxoid Vaccination: No


Hx Influenza Vaccination: No


Hx Pneumococcal Vaccination: Yes (2018)





Review Of Systems


Review Of Systems: ROS cannot be obtained secondary to pt's inabilty to answer 

questions.





Physical Exam





- Physical Exam


Additional Physical Exam Comments: 


Abrasion to L knee, abrasion to R elbow.





ED Course And Treatment





- Laboratory Results


Result Diagrams: 


                                 02/10/19 19:27





                                 02/10/19 19:27


O2 Sat by Pulse Oximetry: 96





Medical Decision Making


Medical Decision Making: 


Wife called, states she can not pick patient up. 





Friends arrived because wife called them. They state this is patient's typical 

presentation when he has drunk alcohol and feel comfortable to take him home. 

Instructed to bring patient back for any change in behavior, lethargy, vomiting,

seizure, or any other problem. 





Disposition





- Disposition


Referrals: 


FAMILY PROVIDER,NO [Primary Care Provider] - 


Disposition: HOME/ ROUTINE


Disposition Time: 19:40


Condition: STABLE


Instructions:  Dementia (DC)


Forms:  [facility] (English)





- Clinical Impression


Clinical Impression: 


 Abrasion

## 2019-03-03 ENCOUNTER — HOSPITAL ENCOUNTER (EMERGENCY)
Dept: HOSPITAL 31 - C.ER | Age: 79
Discharge: HOME | End: 2019-03-03
Payer: MEDICARE

## 2019-03-03 VITALS
SYSTOLIC BLOOD PRESSURE: 156 MMHG | TEMPERATURE: 98.5 F | DIASTOLIC BLOOD PRESSURE: 78 MMHG | HEART RATE: 72 BPM | RESPIRATION RATE: 18 BRPM

## 2019-03-03 VITALS — OXYGEN SATURATION: 99 %

## 2019-03-03 VITALS — BODY MASS INDEX: 33.4 KG/M2

## 2019-03-03 DIAGNOSIS — I10: ICD-10-CM

## 2019-03-03 DIAGNOSIS — E78.5: ICD-10-CM

## 2019-03-03 DIAGNOSIS — Z03.89: Primary | ICD-10-CM
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
## 2019-03-03 DIAGNOSIS — E78.00: ICD-10-CM

## 2019-03-03 NOTE — C.PDOC
History Of Present Illness


79 year old male presents to ED for evaluation s/p falling in the lobby of his 

apartment. Patient has a PMHx of hypertension, hyperlipidemia, and substance 

abuse. Patient was seen previously for similar complaint. Patient denies any 

physical complaints. in er asking for discharge immediately. ambulating in nad. 

clinically sober. denies any complaints states "i dont' know why they brought me

here" orinted x 3 Patient denies headache and dizziness. 








- HPI


Time Seen by Provider: 03/03/19 17:07


Chief Complaint (Nursing): Trauma


History Per: Patient


History/Exam Limitations: no limitations


Onset/Duration Of Symptoms: Hrs


Severity: None





- Fall


Fall:Prior To Injury: Tripped





Past Medical History


Reviewed: Historical Data, Nursing Documentation, Vital Signs





- Medical History


PMH: Cardia Arrhythmia, COPD, Emphysema, HTN, Hypercholesterolemia, Sleep Apnea 

(HAS C-PAP DOESN'T USE)


Surgical History: Coronary Stent (X2)





- [facility] Procedures











CORONARY ARTERIOGRAM NEC (08/26/15)


LEFT HEART CARDIAC CATH (08/26/15)








Family History: States: Unknown Family Hx





- Social History


Hx Tobacco Use: No


Hx Alcohol Use: Yes (Socially)


Hx Substance Use: No





- Immunization History


Hx Tetanus Toxoid Vaccination: No


Hx Influenza Vaccination: Yes


Hx Pneumococcal Vaccination: Yes (2018)





Review Of Systems


Constitutional: Negative for: Fever, Chills, Weakness


Cardiovascular: Negative for: Chest Pain, Light Headedness


Respiratory: Negative for: Shortness of Breath


Musculoskeletal: Negative for: Back Pain


Neurological: Negative for: Weakness, Numbness, Headache, Dizziness





Physical Exam





- Physical Exam


Appears: Well, No Acute Distress


Skin: Normal Color, Warm, Dry


Head: Atraumatic, Normacephalic


Neck: Normal ROM, Supple


Chest: Symmetrical, No Deformity


Cardiovascular: Rhythm Regular, No Murmur


Respiratory: No Accessory Muscle Use


Gastrointestinal/Abdominal: Soft, No Tenderness


Extremity: Other (Abrasion to the left knee)


Neurological/Psych: Oriented x3, Normal Speech, Normal Cognition





ED Course And Treatment





- CT Scan/US


  ** Head CT


Other Rad Studies (CT/US): Interpreted By Me, Read By Radiologist


CT/US Interpretation: IMPRESSION:  No acute intracranial abnormality.  Mild 

brain atrophy and ventricle dilatation.  Mild inflammatory changes paranasal 

sinuses.  Clinical correlation advised.





Medical Decision Making


Medical Decision Making: 


Impression: 79 year old male s/p fall, ho of etoh. oriented x 3 asking for dc. 





Plan: 


Head CT ordered for patient. 


Glucose POC ordered for patient. 





obseved in nad ambulatory steady gait in nad. 





Disposition





- Disposition


Disposition: HOME/ ROUTINE


Disposition Time: 18:00


Condition: STABLE


Additional Instructions: 


return to any er with worsening. 


Instructions:  Preventing Falls, Alcohol Abuse and Alcoholism (DC)


Forms:  [facility] (English)





- Clinical Impression


Clinical Impression: 


 Fall








- Scribe Statement


The provider has reviewed the documentation as recorded by the Scribe (Delia Carbajal)








All medical record entries made by the Scribe were at my direction and 

personally dictated by me. I have reviewed the chart and agree that the record 

accurately reflects my personal performance of the history, physical exam, 

medical decision making, and the department course for this patient. I have also

personally directed, reviewed, and agree with the discharge instructions and 

disposition.

## 2019-03-04 NOTE — CT
Date of service: 



03/03/2019



PROCEDURE:  CT HEAD WITHOUT CONTRAST.



HISTORY:

trauma/fall



COMPARISON:

None available.



TECHNIQUE:

Axial computed tomography images were obtained through the head/brain 

without intravenous contrast.  



Radiation dose:



Total exam DLP = 940.86 mGy-cm.



This CT exam was performed using one or more of the following dose 

reduction techniques: Automated exposure control, adjustment of the 

mA and/or kV according to patient size, and/or use of iterative 

reconstruction technique.



FINDINGS:



HEMORRHAGE:

No intracranial hemorrhage. 



BRAIN:

No mass effect or edema.  Moderate atrophy and chronic microvascular 

ischemic changes.



VENTRICLES:

Unremarkable. No hydrocephalus. 



CALVARIUM:

Unremarkable.



PARANASAL SINUSES:

Unremarkable as visualized. No significant inflammatory changes.



MASTOID AIR CELLS:

Unremarkable as visualized. No inflammatory changes.



OTHER FINDINGS:

None.



IMPRESSION:

No bleef..

## 2019-04-03 ENCOUNTER — HOSPITAL ENCOUNTER (EMERGENCY)
Dept: HOSPITAL 31 - C.ER | Age: 79
LOS: 1 days | Discharge: HOME | End: 2019-04-04
Payer: MEDICARE

## 2019-04-03 VITALS — TEMPERATURE: 97.8 F

## 2019-04-03 VITALS — BODY MASS INDEX: 33.4 KG/M2

## 2019-04-03 DIAGNOSIS — F03.90: ICD-10-CM

## 2019-04-03 DIAGNOSIS — F10.129: Primary | ICD-10-CM

## 2019-04-03 DIAGNOSIS — E78.00: ICD-10-CM

## 2019-04-03 DIAGNOSIS — I10: ICD-10-CM

## 2019-04-03 NOTE — C.PDOC
History Of Present Illness


78 y/o male,w/PMhx of ETOH abuse and dementia, brought to ER by ambulance for 

ETOH intoxication. Patient states that he was drinking ETOH. Patient has been 

evaluated for ETOH abuse in [facility]. His last visit was in 2/10/19 when he was

found to have ETOH level above 200.Denies having suicidal ideation, homicidal 

ideation, and active physical complaints.





<Rambo Duong - Last Filed: 04/04/19 00:51>


History Per: Patient


History/Exam Limitations: no limitations





<Rambo Duong - Last Filed: 04/04/19 00:51>





<Yumiko Frias - Last Filed: 04/04/19 05:34>


Time Seen by Provider: 04/03/19 18:15


Chief Complaint (Nursing): Substance Abuse





Past Medical History


Reviewed: Historical Data, Nursing Documentation, Vital Signs


Vital Signs: 





                                Last Vital Signs











Temp  97.8 F   04/03/19 17:28


 


Pulse  76   04/03/19 17:28


 


Resp  13   04/03/19 17:28


 


BP  89/51 L  04/03/19 17:28


 


Pulse Ox  95   04/03/19 17:28














- Medical History


PMH: Cardia Arrhythmia, COPD, Emphysema, HTN, Hypercholesterolemia, Sleep Apnea 

(HAS C-PAP DOESN'T USE)


Surgical History: Coronary Stent (X2)





- [facility] Procedures











CORONARY ARTERIOGRAM NEC (08/26/15)


LEFT HEART CARDIAC CATH (08/26/15)








Family History: States: No Known Family Hx





- Social History


Hx Tobacco Use: No


Hx Alcohol Use: Yes (Socially)


Hx Substance Use: No





- Immunization History


Hx Tetanus Toxoid Vaccination: No


Hx Influenza Vaccination: Yes


Hx Pneumococcal Vaccination: Yes (2018)





<Rambo Duong - Last Filed: 04/04/19 00:51>


Vital Signs: 





                                Last Vital Signs











Temp  97.8 F   04/03/19 17:28


 


Pulse  76   04/04/19 02:47


 


Resp  16   04/04/19 02:47


 


BP  107/65   04/04/19 02:47


 


Pulse Ox  95   04/04/19 02:47














- [facility] Procedures











CORONARY ARTERIOGRAM NEC (08/26/15)


LEFT HEART CARDIAC CATH (08/26/15)











<Yumiko Frias - Last Filed: 04/04/19 05:34>





Review Of Systems


Except As Marked, All Systems Reviewed And Found Negative.


Constitutional: Negative for: Fever, Chills


Psych: Negative for: Suicidal ideation





<Rambo Duong - Last Filed: 04/04/19 00:51>





Physical Exam





- Physical Exam


Appears: No Acute Distress, Other (obese elderly male)


Skin: Normal Color, Warm, Dry


Head: Normacephalic, Abrasion (small healed abrasion on forehead, appears to be 

chronic)


Eye(s): bilateral: Normal Inspection


Nose: Normal


Oral Mucosa: Moist


Neck: Supple


Chest: Symmetrical


Cardiovascular: Rhythm Regular


Respiratory: Normal Breath Sounds, No Rales, No Rhonchi, No Wheezing


Gastrointestinal/Abdominal: Normal Exam, Soft, No Tenderness, No Guarding, No 

Rebound


Neurological/Psych: Other (pt is answering questions,but he is not making sense)





<Rambo Duong - Last Filed: 04/04/19 00:51>





ED Course And Treatment


O2 Sat by Pulse Oximetry: 95 (RA)


Pulse Ox Interpretation: Normal





- CT Scan/US


  ** CT-Head


Other Rad Studies (CT/US): Read By Radiologist, Radiology Report Reviewed


CT/US Interpretation: EXAM:  CT Head without Intravenous Contrast.  CLINICAL HI

STORY:  Fall, ataxic, ? SDH.  TECHNIQUE:  Axial computed tomography images of 

the head/brain without intravenous contrast.  0.00 mGy-cm.  COMPARISON:  None 

provided.  FINDINGS:  BRAIN.  No acute intraparenchymal hemorrhage. No mass 

lesion. No CT evidence for acute territorial infarct. No midline shift or extra-

axial collections.  VENTRICLES:  No hydrocephalus.  ORBITS:  The orbits are 

unremarkable.  SINUSES AND MASTOIDS:  The paranasal sinuses and mastoid air 

cells are clear.  BONES:  No fracture.  SOFT TISSUES:  Unremarkable.  

IMPRESSION:  No acute intracranial abnormality.  


Reevaluation Time: 00:51


Reassessment Condition: Improved (clinically sober, but cannot be d/c to street 

and no safe ride home.  Hold until AM)





<Rambo Duong Last Filed: 04/04/19 00:51>


Pulse Ox Interpretation: Normal


Reassessment Condition: Improved





<Yumiko Frias - Last Filed: 04/04/19 05:34>





Medical Decision Making


Medical Decision Making: 


Plan:


--CT-Head





Patient is now clinically sober. Patients wife was called so that she can come 

to pick him up, and she refuses. There have been many prior instances where 

patients wife has refused to pick him up. I declined to discharge a demented 

patient at 10pm. Will keep the patient in the ED until he has sobered up.  





dementa/etoh abuse


0100 signed over to overnight MD





<Rambo Duong - Last Filed: 04/04/19 00:51>





Disposition





- Disposition


Disposition Time: 01:00





<Rambo Duong - Last Filed: 04/04/19 00:51>


Counseled Patient/Family Regarding: Studies Performed, Diagnosis, Need For 

Followup





<Yumiko Frias - Last Filed: 04/04/19 05:34>





- Disposition


Referrals: 


[facility] [Outside]


Condition: FAIR


Instructions:  Dementia (Including Alzheimer Disease)


Forms:  [email or web address] (English)





- Clinical Impression


Clinical Impression: 


 Alcohol intoxication, Dementia








- Scribe Statement


The provider has reviewed the documentation as recorded by the Scribe


Shiloh Daugherty


Provider Attestation: 





All medical record entries made by the Scribe were at my direction and 

personally dictated by me. I have reviewed the chart and agree that the record 

accurately reflects my personal performance of the history, physical exam, 

medical decision making, and the department course for this patient. I have also

 personally directed, reviewed, and agree with the discharge instructions and 

disposition.





<Rambo Duong - Last Filed: 04/04/19 00:51>





Physician Patient Turnover


Patient Signed Over To: Yumiko Frias


Handoff Comments: dispo home in AM





<Rambo Duong - Last Filed: 04/04/19 00:51>

## 2019-04-04 VITALS — OXYGEN SATURATION: 97 % | DIASTOLIC BLOOD PRESSURE: 74 MMHG | SYSTOLIC BLOOD PRESSURE: 165 MMHG | HEART RATE: 83 BPM

## 2019-04-04 VITALS — RESPIRATION RATE: 18 BRPM

## 2019-04-04 NOTE — CT
Date of service: 



04/03/2019



PROCEDURE:  CT HEAD WITHOUT CONTRAST.



HISTORY:

Fall.  Ataxia.  Subdural hemorrhage. 



COMPARISON:

03/03/2019



TECHNIQUE:

Axial computed tomography images were obtained through the head/brain 

without intravenous contrast.  



Radiation dose:



Total exam DLP = 949.09 mGy-cm.



This CT exam was performed using one or more of the following dose 

reduction techniques: Automated exposure control, adjustment of the 

mA and/or kV according to patient size, and/or use of iterative 

reconstruction technique.



FINDINGS:



HEMORRHAGE:

No intracranial hemorrhage. 



BRAIN:

No mass effect or edema.  Scattered focal lucencies in the 

subcortical and periventricular white matter suggestive for chronic 

microvascular ischemic change.  Diffuse generalized parenchymal 

atrophy.  Additional atrophic changes in the anterior bilateral 

temporal lobes.  Punctate lacunar infarcts in the right caudate head 

and right basal ganglia.



VENTRICLES:

Unremarkable. No hydrocephalus. 



CALVARIUM:

Unremarkable.



PARANASAL SINUSES:

Unremarkable as visualized. No significant inflammatory changes.



MASTOID AIR CELLS:

Unremarkable as visualized. No inflammatory changes.



OTHER FINDINGS:

None.



IMPRESSION:

No acute intracranial abnormality. 



Chronic microvascular ischemic change. 



Diffuse generalized parenchymal atrophy including bilateral anterior 

temporal lobe atrophy. 



If symptoms persists, consider correlation with MRI. 



A preliminary report was generated at 7:33 p.m. on 04/03/2019 by Dr. Duke Alvarez from [facility].

## 2024-12-24 NOTE — CT
PROCEDURE:  CT HEAD WITHOUT CONTRAST.



HISTORY:

msc



COMPARISON:

None available. 



TECHNIQUE:

Axial computed tomography images were obtained through the head/brain 

without intravenous contrast.  



Radiation dose:



Total exam DLP = 765.8 mGy-cm.



This CT exam was performed using one or more of the following dose 

reduction techniques: Automated exposure control, adjustment of the 

mA and/or kV according to patient size, and/or use of iterative 

reconstruction technique.



FINDINGS:



HEMORRHAGE:

No intracranial hemorrhage. 



BRAIN:

No mass effect or edema. Atrophy. Chronic microvascular ischemic 

changes. Bilateral basal ganglia lacunar infarctions. 



VENTRICLES:

Unremarkable. No hydrocephalus. 



CALVARIUM:

Unremarkable.



PARANASAL SINUSES:

Unremarkable as visualized. No significant inflammatory changes.



MASTOID AIR CELLS:

Unremarkable as visualized. No inflammatory changes.



OTHER FINDINGS:

None.



IMPRESSION:

No acute intracranial pathology. 5